# Patient Record
Sex: MALE | Race: WHITE | NOT HISPANIC OR LATINO
[De-identification: names, ages, dates, MRNs, and addresses within clinical notes are randomized per-mention and may not be internally consistent; named-entity substitution may affect disease eponyms.]

---

## 2023-07-13 PROBLEM — Z00.00 ENCOUNTER FOR PREVENTIVE HEALTH EXAMINATION: Status: ACTIVE | Noted: 2023-07-13

## 2023-07-14 ENCOUNTER — APPOINTMENT (OUTPATIENT)
Dept: ORTHOPEDIC SURGERY | Facility: CLINIC | Age: 78
End: 2023-07-14
Payer: MEDICARE

## 2023-07-14 VITALS — HEIGHT: 67 IN | WEIGHT: 175 LBS | BODY MASS INDEX: 27.47 KG/M2

## 2023-07-14 DIAGNOSIS — Z78.9 OTHER SPECIFIED HEALTH STATUS: ICD-10-CM

## 2023-07-14 PROCEDURE — 73564 X-RAY EXAM KNEE 4 OR MORE: CPT | Mod: 50

## 2023-07-14 PROCEDURE — 99203 OFFICE O/P NEW LOW 30 MIN: CPT

## 2023-07-14 RX ORDER — IBUPROFEN 800 MG
800 TABLET ORAL
Refills: 0 | Status: ACTIVE | COMMUNITY

## 2023-07-14 RX ORDER — OXYCODONE HCL/ACETAMINOPHEN 5 MG-500MG
5-500 CAPSULE ORAL
Refills: 0 | Status: ACTIVE | COMMUNITY

## 2023-07-14 NOTE — PHYSICAL EXAM
[Bilateral] : knee bilaterally [] : light touch is intact throughout [TWNoteComboBox7] : flexion 95 degrees [de-identified] : extension 0 degrees

## 2023-07-14 NOTE — HISTORY OF PRESENT ILLNESS
[Gradual] : gradual [10] : 10 [1] : 2 [Localized] : localized [Sharp] : sharp [Shooting] : shooting [Frequent] : frequent [Household chores] : household chores [Nothing helps with pain getting better] : Nothing helps with pain getting better [Walking] : walking [Stairs] : stairs [Retired] : Work status: retired [de-identified] : 7/14/23: 76yo M with longstanding right knee pain for many years that has worsened over the past few months. Admits to increasing pain and difficulty with prolonged walking/standing, startup, and stairs. He has tried multiple modalities of conservative tx including CSIs and visco series that are no longer providing sufficient relief.  [] : no [FreeTextEntry5] : 77 year old male presents for pain in his right knee. Patients states that in the past he received gel injection and PT which helped in the moment but now the pain is worse. Patient states that years ago he was recommended a knee replacement. Patient is also experiencing pain in left knee but the pain is worse on the right side.

## 2023-07-14 NOTE — ASSESSMENT
[FreeTextEntry1] : 77M with adv bilateral knee OA, large lyic proximal tibia lesion\par \par f/u MRI w/wout contrast\par f/u CT R knee\par recommend f.u orthopedic oncology re further workup, discuss potentially benign lesion but could be metastatic lesion and requires attention prior to any surgical intervention\par \par The patient was advised of the diagnosis. The natural history of the pathology was explained in full to the patient in layman's terms. All questions were answered. The risks and benefits of surgical and non-surgical treatment alternatives were explained in full to the patient. \par

## 2023-07-16 ENCOUNTER — FORM ENCOUNTER (OUTPATIENT)
Age: 78
End: 2023-07-16

## 2023-07-17 ENCOUNTER — APPOINTMENT (OUTPATIENT)
Dept: MRI IMAGING | Facility: CLINIC | Age: 78
End: 2023-07-17
Payer: MEDICARE

## 2023-07-17 ENCOUNTER — TRANSCRIPTION ENCOUNTER (OUTPATIENT)
Age: 78
End: 2023-07-17

## 2023-07-17 ENCOUNTER — APPOINTMENT (OUTPATIENT)
Dept: CT IMAGING | Facility: CLINIC | Age: 78
End: 2023-07-17
Payer: MEDICARE

## 2023-07-17 PROCEDURE — ZZZZZ: CPT

## 2023-07-17 PROCEDURE — 73700 CT LOWER EXTREMITY W/O DYE: CPT | Mod: NC

## 2023-07-17 PROCEDURE — 76376 3D RENDER W/INTRP POSTPROCES: CPT

## 2023-07-17 PROCEDURE — 73723 MRI JOINT LWR EXTR W/O&W/DYE: CPT | Mod: RT

## 2023-07-24 ENCOUNTER — APPOINTMENT (OUTPATIENT)
Dept: ORTHOPEDIC SURGERY | Facility: CLINIC | Age: 78
End: 2023-07-24
Payer: MEDICARE

## 2023-07-24 VITALS — WEIGHT: 175 LBS | BODY MASS INDEX: 27.47 KG/M2 | HEIGHT: 67 IN

## 2023-07-24 DIAGNOSIS — C80.1 MALIGNANT (PRIMARY) NEOPLASM, UNSPECIFIED: ICD-10-CM

## 2023-07-24 PROCEDURE — 99214 OFFICE O/P EST MOD 30 MIN: CPT

## 2023-07-24 NOTE — HISTORY OF PRESENT ILLNESS
[Right Leg] : right leg [10] : 10 [4] : 4 [Localized] : localized [Sharp] : sharp [Constant] : constant [Rest] : rest [Walking] : walking [Exercising] : exercising [Gel One] : Gel One [de-identified] : 7/14/23: 76yo M with longstanding right knee pain for many years that has worsened over the past few months. Admits to increasing pain and difficulty with prolonged walking/standing, startup, and stairs. He has tried multiple modalities of conservative tx including CSIs and visco series that are no longer providing sufficient relief. \par 7/24/23 mri ct review today, symptoms unchanged [] : no [FreeTextEntry1] : rt knee  [FreeTextEntry5] : CT and MRI results

## 2023-07-24 NOTE — PHYSICAL EXAM
[Bilateral] : knee bilaterally [] : light touch is intact throughout [TWNoteComboBox7] : flexion 95 degrees [de-identified] : extension 0 degrees

## 2023-07-24 NOTE — ASSESSMENT
[FreeTextEntry1] : 77M with adv bilateral knee OA, large lyic proximal tibia lesion\par \par MRI CT reviewed\par recommend f.u orthopedic oncology re further workup, discussed potentially benign lesion but could be metastatic lesion and requires attention prior to any surgical intervention\par \par The patient was advised of the diagnosis. The natural history of the pathology was explained in full to the patient in layman's terms. All questions were answered. The risks and benefits of surgical and non-surgical treatment alternatives were explained in full to the patient. \par

## 2023-07-31 ENCOUNTER — APPOINTMENT (OUTPATIENT)
Dept: ORTHOPEDIC SURGERY | Facility: CLINIC | Age: 78
End: 2023-07-31
Payer: MEDICARE

## 2023-07-31 VITALS
WEIGHT: 175 LBS | DIASTOLIC BLOOD PRESSURE: 71 MMHG | TEMPERATURE: 98 F | HEART RATE: 65 BPM | BODY MASS INDEX: 27.47 KG/M2 | HEIGHT: 67 IN | SYSTOLIC BLOOD PRESSURE: 177 MMHG

## 2023-07-31 DIAGNOSIS — M17.11 UNILATERAL PRIMARY OSTEOARTHRITIS, RIGHT KNEE: ICD-10-CM

## 2023-07-31 DIAGNOSIS — M89.9 DISORDER OF BONE, UNSPECIFIED: ICD-10-CM

## 2023-07-31 PROCEDURE — 99214 OFFICE O/P EST MOD 30 MIN: CPT

## 2023-08-08 PROBLEM — M17.11 PRIMARY OSTEOARTHRITIS OF RIGHT KNEE: Status: ACTIVE | Noted: 2023-07-14

## 2023-08-08 PROBLEM — M89.9 LYTIC LESION OF BONE ON X-RAY: Status: ACTIVE | Noted: 2023-07-14

## 2023-08-08 NOTE — PHYSICAL EXAM
[FreeTextEntry1] : On exam the patient stands in good balance.  He has tenderness at the joint lines as well as at the proximal tibia on the right worse than the left knee pain.  He has an overall varus alignment.  He is correctable.  He is stable to varus valgus and anterior testing.  Range of motion is 0 to 110 degrees.  His calves are soft and he is neurovascular intact. [General Appearance - Well-Appearing] : Well appearing [General Appearance - Well Nourished] : well nourished [Oriented To Time, Place, And Person] : Oriented to person, place, and time [Sclera] : the sclera and conjunctiva were normal [Neck Cervical Mass (___cm)] : no neck mass was observed [Heart Rate And Rhythm] : heart rate was normal and rhythm regular [] : No respiratory distress [Abdomen Soft] : Soft [Antalgic Gait Right] : antalgic on the right [Antalgic Gait Left] : antalgic on the left [Tenderness] : tenderness [Swelling] : no swelling [Skin Changes - Describe changes:] : No skin changes noted [Full ROM Unless otherwise noted:] : Full range of motion unless otherwise noted: [LE  Motor Strength Normal unless otherwise noted:] : 5/5 strength in bilateral lower extemities unless otherwise noted. [Normal] : Sensation intact to light touch.

## 2023-08-08 NOTE — DISCUSSION/SUMMARY
[All Questions Answered] : Patient (and family) had all questions answered to an agreeable level of satisfaction [Interested in Proceeding] : Patient (and family) expressed understanding and interest in proceeding with the plan as outlined [de-identified] : Patient significant degenerative arthrosis in bilateral knees right worse than left there is also significant bone loss in the right proximal tibia.  My recommendation is to fill this with bone and then to plan for a longstem tibial component.  We can do this with a robot-assisted however he is going to need to have pins placed in the tibial shaft rather than in the incision.  I would also likely use a significant bone graft and a sleeve in the area.  Some of the autograft and some will be allograft.  He understands this is to help him with his arthrosis however he is at risk for other issues such as early loosening. We discussed the risks benefits and alternatives of arthroplasty including the risks of bleeding requiring transfusion, infection, late infection from bacteremia, instability, malalignment, periprosthetic fracture, early need for revision, loosening, possible dislocation, numbness in the area of surgery, continued pain, altered gait, limb length discrepancy, deep vein thrombosis and medical and anesthetic complications.  He is going to decide whether to have surgery with me or with his original orthopedist.  If he has surgery with me him going get a new CT scan for Florin planning.  If imaging was ordered, the patient was told to make an appointment to review findings right after all imaging is completed.  We discussed risks, benefits and alternatives. Rationale of care was reviewed and all questions were answered. Patient (and family) had all questions answered to her degree of the level of satisfaction. Patient (and family) expressed understanding and interest in proceeding with the plan as outlined.     This note was done with a voice recognition transcription software and any typos are related to this rather than medical error. Surgical risks reviewed. Patient (and family) had all questions answered to an agreeable level of satisfaction. Patient (and family) expressed understanding and interest in proceeding with the plan as outlined.

## 2023-08-08 NOTE — DATA REVIEWED
[Imaging Present] : Present [de-identified] : X-rays reviewed from July 2023 show significant varus arthritis in bilateral knees.  There is significant bone lesion in the proximal tibia of the right compared to the left.  There is some faint groundglass appearance.  This does come to the cortex but does not go through.  Is coming from the medial joint line.  In addition there is significant degenerative arthrosis of both knees with varus alignment medial joint space obliteration and osteophyte formation.  CT scan July 17, 2023 shows large lytic area in the right proximal tibia and severe degenerative disease.  There is lucency consistent with degenerative intraosseous ganglion.  MRI scan from July 17, 2023 shows large lytic area in the proximal tibial metaphysis consistent with degenerative lesion.  There is also significant severe diffuse right knee arthrosis with chronic ACL tear ligament sprains medial meniscus tear and loose bodies.  There is also extensor mechanism tendinopathy.

## 2023-08-08 NOTE — REVIEW OF SYSTEMS
[Feeling Tired] : feeling tired [Joint Pain] : joint pain [Joint Stiffness] : joint stiffness [Joint Swelling] : joint swelling [Nl] : Hematologic/Lymphatic

## 2023-08-08 NOTE — HISTORY OF PRESENT ILLNESS
[FreeTextEntry1] : This is a 77 years old gentleman who comes in complaining of bilateral right worse than left knee pain that has been going on for many years.  He has tried corticosteroid injections with no relief.  He has tried physical therapy.  He has viscosupplementation in the past with no relief.  He now has continued pain.  He was recommended for knee replacement was found to have a bone lesion so sent to me for further evaluation. [Worsening] : worsening [7] : currently ~his/her~ pain is 7 out of 10

## 2023-08-08 NOTE — REASON FOR VISIT
[FreeTextEntry1] : Bilateral right worse than left knee pain, sent with a lesion seen in the right proximal tibia, sen by Dr. Chicas

## 2023-08-08 NOTE — CONSULT LETTER
[Dear  ___] : Dear  [unfilled], [FreeTextEntry2] : Aviva Marin MD [address] [place] [address],  [address] [place] [address] [FreeTextEntry1] :  After evaluating your patient and reviewing the studies presented we have come to a mutually agreeable plan.   Please see my note below.  Should you have further questions, please feel free to call and discuss the care of your patient.  Thank you for the confidence of your referral.  Sincerely,   Josemanuel Reynolds MD  Chief, Musculoskeletal Oncology   [phone]

## 2023-08-16 ENCOUNTER — OUTPATIENT (OUTPATIENT)
Dept: OUTPATIENT SERVICES | Facility: HOSPITAL | Age: 78
LOS: 1 days | End: 2023-08-16
Payer: MEDICARE

## 2023-08-16 VITALS
HEIGHT: 64.5 IN | OXYGEN SATURATION: 99 % | HEART RATE: 58 BPM | SYSTOLIC BLOOD PRESSURE: 156 MMHG | DIASTOLIC BLOOD PRESSURE: 63 MMHG | RESPIRATION RATE: 16 BRPM | TEMPERATURE: 99 F | WEIGHT: 184.97 LBS

## 2023-08-16 DIAGNOSIS — M17.11 UNILATERAL PRIMARY OSTEOARTHRITIS, RIGHT KNEE: ICD-10-CM

## 2023-08-16 DIAGNOSIS — Z95.5 PRESENCE OF CORONARY ANGIOPLASTY IMPLANT AND GRAFT: ICD-10-CM

## 2023-08-16 DIAGNOSIS — Z90.49 ACQUIRED ABSENCE OF OTHER SPECIFIED PARTS OF DIGESTIVE TRACT: Chronic | ICD-10-CM

## 2023-08-16 DIAGNOSIS — M89.9 DISORDER OF BONE, UNSPECIFIED: ICD-10-CM

## 2023-08-16 DIAGNOSIS — Z98.890 OTHER SPECIFIED POSTPROCEDURAL STATES: Chronic | ICD-10-CM

## 2023-08-16 DIAGNOSIS — C02.9 MALIGNANT NEOPLASM OF TONGUE, UNSPECIFIED: Chronic | ICD-10-CM

## 2023-08-16 DIAGNOSIS — I10 ESSENTIAL (PRIMARY) HYPERTENSION: ICD-10-CM

## 2023-08-16 LAB
A1C WITH ESTIMATED AVERAGE GLUCOSE RESULT: 4.4 % — SIGNIFICANT CHANGE UP (ref 4–5.6)
ANION GAP SERPL CALC-SCNC: 12 MMOL/L — SIGNIFICANT CHANGE UP (ref 7–14)
APPEARANCE UR: CLEAR — SIGNIFICANT CHANGE UP
BILIRUB UR-MCNC: NEGATIVE — SIGNIFICANT CHANGE UP
BLD GP AB SCN SERPL QL: NEGATIVE — SIGNIFICANT CHANGE UP
BUN SERPL-MCNC: 17 MG/DL — SIGNIFICANT CHANGE UP (ref 7–23)
CALCIUM SERPL-MCNC: 9.5 MG/DL — SIGNIFICANT CHANGE UP (ref 8.4–10.5)
CHLORIDE SERPL-SCNC: 104 MMOL/L — SIGNIFICANT CHANGE UP (ref 98–107)
CO2 SERPL-SCNC: 25 MMOL/L — SIGNIFICANT CHANGE UP (ref 22–31)
COLOR SPEC: YELLOW — SIGNIFICANT CHANGE UP
CREAT SERPL-MCNC: 0.91 MG/DL — SIGNIFICANT CHANGE UP (ref 0.5–1.3)
DIFF PNL FLD: NEGATIVE — SIGNIFICANT CHANGE UP
EGFR: 88 ML/MIN/1.73M2 — SIGNIFICANT CHANGE UP
ESTIMATED AVERAGE GLUCOSE: 80 — SIGNIFICANT CHANGE UP
GLUCOSE SERPL-MCNC: 103 MG/DL — HIGH (ref 70–99)
GLUCOSE UR QL: NEGATIVE MG/DL — SIGNIFICANT CHANGE UP
HCT VFR BLD CALC: 40.1 % — SIGNIFICANT CHANGE UP (ref 39–50)
HGB BLD-MCNC: 13.7 G/DL — SIGNIFICANT CHANGE UP (ref 13–17)
KETONES UR-MCNC: NEGATIVE MG/DL — SIGNIFICANT CHANGE UP
LEUKOCYTE ESTERASE UR-ACNC: NEGATIVE — SIGNIFICANT CHANGE UP
MCHC RBC-ENTMCNC: 31.3 PG — SIGNIFICANT CHANGE UP (ref 27–34)
MCHC RBC-ENTMCNC: 34.2 GM/DL — SIGNIFICANT CHANGE UP (ref 32–36)
MCV RBC AUTO: 91.6 FL — SIGNIFICANT CHANGE UP (ref 80–100)
NITRITE UR-MCNC: NEGATIVE — SIGNIFICANT CHANGE UP
NRBC # BLD: 0 /100 WBCS — SIGNIFICANT CHANGE UP (ref 0–0)
NRBC # FLD: 0 K/UL — SIGNIFICANT CHANGE UP (ref 0–0)
PH UR: 5.5 — SIGNIFICANT CHANGE UP (ref 5–8)
PLATELET # BLD AUTO: 167 K/UL — SIGNIFICANT CHANGE UP (ref 150–400)
POTASSIUM SERPL-MCNC: 3.7 MMOL/L — SIGNIFICANT CHANGE UP (ref 3.5–5.3)
POTASSIUM SERPL-SCNC: 3.7 MMOL/L — SIGNIFICANT CHANGE UP (ref 3.5–5.3)
PROT UR-MCNC: NEGATIVE MG/DL — SIGNIFICANT CHANGE UP
RBC # BLD: 4.38 M/UL — SIGNIFICANT CHANGE UP (ref 4.2–5.8)
RBC # FLD: 12.7 % — SIGNIFICANT CHANGE UP (ref 10.3–14.5)
RH IG SCN BLD-IMP: NEGATIVE — SIGNIFICANT CHANGE UP
SODIUM SERPL-SCNC: 141 MMOL/L — SIGNIFICANT CHANGE UP (ref 135–145)
SP GR SPEC: 1.02 — SIGNIFICANT CHANGE UP (ref 1–1.03)
UROBILINOGEN FLD QL: 0.2 MG/DL — SIGNIFICANT CHANGE UP (ref 0.2–1)
WBC # BLD: 7.65 K/UL — SIGNIFICANT CHANGE UP (ref 3.8–10.5)
WBC # FLD AUTO: 7.65 K/UL — SIGNIFICANT CHANGE UP (ref 3.8–10.5)

## 2023-08-16 PROCEDURE — 93010 ELECTROCARDIOGRAM REPORT: CPT

## 2023-08-16 RX ORDER — CHLORHEXIDINE GLUCONATE 213 G/1000ML
1 SOLUTION TOPICAL DAILY
Refills: 0 | Status: DISCONTINUED | OUTPATIENT
Start: 2023-08-30 | End: 2023-09-01

## 2023-08-16 RX ORDER — SODIUM CHLORIDE 9 MG/ML
3 INJECTION INTRAMUSCULAR; INTRAVENOUS; SUBCUTANEOUS EVERY 8 HOURS
Refills: 0 | Status: DISCONTINUED | OUTPATIENT
Start: 2023-08-30 | End: 2023-09-01

## 2023-08-16 NOTE — H&P PST ADULT - BP NONINVASIVE DIASTOLIC (MM HG)
Assessment/Plan:    Healthcare maintenance  Patient is here today for a full physical   He did have extensive labs completed prior to the visit today we did discuss the results  The only outlying lab test that was noted was an increase in his cholesterol reading to almost 300  Otherwise labs were essentially normal   Patient states in general doing well his back is doing better with less pain  He continues to do exercise on a daily basis, stretching  States that occasionally he does have some weakness in the right leg which does inhibit ambulation for short period of time but this is transient  Patient has no significant pain  He denies any chest pain or pressure and no increasing shortness of breath with exertion  States his appetite is adequate he is having no bowel or bladder difficulties  He did undergo a routine colonoscopy and it showed 1 small polyp which was benign  Patient states he has having no difficulties with his appetite no headaches, no lightheadedness, no dizziness  Does relate that he does have decreased auditory acuity bilaterally which is getting worse  Patient did undergo full physical exam today in the office  Of than him being overweight no significant medical problems on physical exam were found  Patient will have a referral be seen by audiology for hearing testing  Was placed on a statin drug and we will check liver function tests and cholesterol in approximately 4 months  We did discuss possible side effects of the medication a was told to call if any problems with the medication    Chronic bilateral low back pain without sciatica  Patient states that he has functional   No significant chronic pain or difficulties related to his low back pain discomfort  He is following the exercise as prescribed by physical therapy    He was told if any change as far as symptomatology is concerned, any increasing weakness to lower extremities, bowel or bladder difficulties to please call     Familial hypercholesterolemia  Patient relates that there is a strong family history of hyperlipidemia  His cholesterol is elevated and we did discuss with the patient today the importance of watching his intake of fatty foods, working at weight loss  Patient's cholesterol is high enough that we feel it is appropriate to start medication  He was given a prescription for Crestor 5 mg to take daily  He was told that this medication does work in the liver and can cause some slight inflammation to liver tissue  Was told if any darkening of the urine, at jaundice to please call  Patient was also told about possible myalgias, muscle pain that can be a side effect from the medication  Patient was told if he develops any of these symptoms to please call immediately for evaluation  Again we will be checking patient's liver enzymes and cholesterol with his next visit    Fatigue  Patient relates that he is having less difficulty with fatigue  He further relates that it is helped that 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 and he is getting more restful sleep  Again we did check routine labs showing no abnormalities of any significant that could be causing fatigability  Patient was told that he may benefit from a routine exercise program aerobic activity on a daily basis  Patient understands and agrees  Was told if any significant changes prior to his next visit to please call  Overweight  Patient was told again with his BMI is considered overweight  The importance of watching his caloric intake in order to help lose weight  Does relate that he is on a very standard diet without any significant changes  We did discuss with the patient trying to reduce his portion size in order to help lose weight, starting some type of routine exercise program in order to help with weight loss    Patient understands and hopefully we will see some weight loss with his next visit  Decreased hearing of both ears  As mentioned patient is having difficulties with hearing loss  He states this is become progressively worse over the years  We did discuss with the patient having hearing evaluation to formally determine the extent of his hearing loss  Patient understands and agrees  A referral was made to an ears nose and throat physician  No abnormalities were found on physical exam    Left groin mass  Patient has been watching to make sure there is no changes in the left groin area  On evaluation patient does have some fatty tissue  No masses no evidence of herniation  Again patient is urged to call if any significant changes but on physical findings today it seems to be benign       Diagnoses and all orders for this visit:    Familial hypercholesterolemia  -     rosuvastatin (CRESTOR) 5 mg tablet; Take 1 tablet (5 mg total) by mouth daily  -     Lipid panel; Future  -     Hepatic function panel; Future    Healthcare maintenance    Decreased hearing of both ears  -     Ambulatory Referral to Otolaryngology; Future    Chronic bilateral low back pain without sciatica    Fatigue, unspecified type    Overweight    Left groin mass          Subjective:      Patient ID: Jhonathan Marcelino is a 64 y o  male  Patient is a 61-year-old male with a history of hyperlipidemia, chronic low back pain with sciatica on the right, overweight  Patient is here today for a routine physical he did have extensive lab testing performed prior to the visit today and we did discuss the results  Patient states he is doing relatively well in July he did start a new job which she enjoys  He denies any chest pain or pressure and no shortness of breath  Does describe some decreased auditory acuity bilaterally  He denies any headaches, lightheadedness dizziness  Occasionally some weakness in the right leg which may be residual finding from previous sciatic pain, probably nerve entrapment        The following portions of the patient's history were reviewed and updated as appropriate: He  has no past medical history on file  He   Patient Active Problem List    Diagnosis Date Noted    Familial hypercholesterolemia 08/14/2020    Decreased hearing of both ears 08/14/2020    Chronic bilateral low back pain without sciatica 12/05/2019    Fatigue 12/05/2018    Overweight 12/05/2018    Postnasal drip 12/05/2018    Mood disturbance 12/05/2018    KEVIN (obstructive sleep apnea)     Snoring     Daytime sleepiness     Left groin mass 06/26/2018    Healthcare maintenance 06/26/2018     He  has no past surgical history on file  His family history is not on file  He  reports that he has never smoked  He has never used smokeless tobacco  He reports current alcohol use  He reports that he does not use drugs  Current Outpatient Medications   Medication Sig Dispense Refill    rosuvastatin (CRESTOR) 5 mg tablet Take 1 tablet (5 mg total) by mouth daily 30 tablet 5     No current facility-administered medications for this visit  No current outpatient medications on file prior to visit  No current facility-administered medications on file prior to visit  He has No Known Allergies       Review of Systems   Constitutional: Positive for activity change (States with his new job he is more physically active)  Negative for appetite change, chills, diaphoresis, fatigue, fever and unexpected weight change  HENT: Positive for hearing loss  Negative for congestion, dental problem, drooling, ear discharge, ear pain, facial swelling, mouth sores, nosebleeds, postnasal drip, rhinorrhea, sinus pressure, sinus pain, sneezing, sore throat, tinnitus, trouble swallowing and voice change  Eyes: Negative  Respiratory: Negative  Cardiovascular: Negative  Gastrointestinal: Negative  Endocrine: Negative  Genitourinary: Negative      Musculoskeletal: Positive for back pain ( patient admits to some significant improvement with his chronic low back pain and discomfort  )  Negative for arthralgias, gait problem, joint swelling, myalgias, neck pain and neck stiffness  Skin: Negative  Allergic/Immunologic: Negative  Neurological: Positive for weakness ( occasionally episodic weakness in the right lower extremity)  Negative for dizziness, tremors, seizures, syncope, facial asymmetry, speech difficulty, light-headedness, numbness and headaches  Hematological: Negative  Psychiatric/Behavioral: Negative  Objective:      /68   Pulse 92   Temp 98 6 °F (37 °C)   Ht 5' 10" (1 778 m)   Wt 92 1 kg (203 lb)   SpO2 98%   BMI 29 13 kg/m²          Physical Exam  Vitals signs and nursing note reviewed  Constitutional:       General: He is not in acute distress  Appearance: Normal appearance  He is not ill-appearing, toxic-appearing or diaphoretic  Comments: Pleasant, overweight 53-year-old male who is awake alert no acute distress and oriented x3   HENT:      Head: Normocephalic and atraumatic  Right Ear: Tympanic membrane, ear canal and external ear normal  There is no impacted cerumen  Left Ear: Tympanic membrane, ear canal and external ear normal  There is no impacted cerumen  Ears:      Comments: Noticeable decreased auditory acuity on gross examination     Nose: Nose normal  No congestion or rhinorrhea  Mouth/Throat:      Mouth: Mucous membranes are moist       Pharynx: Oropharynx is clear  No oropharyngeal exudate or posterior oropharyngeal erythema  Eyes:      General: No scleral icterus  Right eye: No discharge  Left eye: No discharge  Extraocular Movements: Extraocular movements intact  Conjunctiva/sclera: Conjunctivae normal       Pupils: Pupils are equal, round, and reactive to light  Neck:      Musculoskeletal: Normal range of motion and neck supple  No neck rigidity or muscular tenderness  Vascular: No carotid bruit  Cardiovascular:      Rate and Rhythm: Normal rate and regular rhythm  Pulses: Normal pulses  Heart sounds: Normal heart sounds  No murmur  No friction rub  No gallop  Pulmonary:      Effort: Pulmonary effort is normal  No respiratory distress  Breath sounds: Normal breath sounds  No stridor  No wheezing, rhonchi or rales  Chest:      Chest wall: No tenderness  Abdominal:      General: Bowel sounds are normal  There is no distension  Palpations: Abdomen is soft  There is no mass  Tenderness: There is no abdominal tenderness  There is no right CVA tenderness, left CVA tenderness, guarding or rebound  Hernia: No hernia is present  Comments: Overweight   Genitourinary:     Penis: Normal        Scrotum/Testes: Normal       Prostate: Normal       Rectum: Normal    Musculoskeletal: Normal range of motion  General: Deformity (Slight flattening to the lumbar spine, full range of motion without pain) present  No swelling, tenderness or signs of injury  Right lower leg: No edema  Left lower leg: No edema  Lymphadenopathy:      Cervical: No cervical adenopathy  Skin:     General: Skin is warm and dry  Capillary Refill: Capillary refill takes less than 2 seconds  Coloration: Skin is not jaundiced or pale  Findings: No bruising, erythema, lesion or rash  Neurological:      Mental Status: He is alert and oriented to person, place, and time  Mental status is at baseline  Cranial Nerves: No cranial nerve deficit  Sensory: No sensory deficit  Motor: No weakness  Coordination: Coordination normal       Gait: Gait normal       Deep Tendon Reflexes: Reflexes abnormal ( patient does have a loss of his deep tendon reflex patella and Achilles on the right)  Psychiatric:         Mood and Affect: Mood normal          Behavior: Behavior normal          Thought Content:  Thought content normal          Judgment: Judgment normal          BMI Counseling: Body mass index is 29 13 kg/m²  The BMI is above normal  Nutrition recommendations include reducing portion sizes, decreasing overall calorie intake, 3-5 servings of fruits/vegetables daily, moderation in carbohydrate intake, increasing intake of lean protein, reducing intake of saturated fat and trans fat and reducing intake of cholesterol  Exercise recommendations include moderate aerobic physical activity for 150 minutes/week  63

## 2023-08-16 NOTE — H&P PST ADULT - ATTENDING COMMENTS
I have reviewed and agree with note as written above  for curettage / bone graft left tibia lesion with total knee arthroplasty, ruslan assist  Risks, benefits and alternatives discussed with patient.  Josemanuel Reynolds MD  Musculoskeletal Oncology  [phone] I have reviewed and agree with note as written above  for curettage / bone graft right tibia lesion with total knee arthroplasty, ruslan assist  Risks, benefits and alternatives discussed with patient.  Josemanuel Reynolds MD  Musculoskeletal Oncology  [phone]

## 2023-08-16 NOTE — H&P PST ADULT - HISTORY OF PRESENT ILLNESS
76 y/o male presents to [facility] preop for right knee arthroplasty, ruslan assist, resection assist, resection of bone lesion with allografting filling. Pt reports bilateral knee pain, worse on the right then the left knee for many years. He has tried conservative measures (steroid injections and physical therapy) with no relief. Xray of the bilateral knees revealed significant arthritis.

## 2023-08-16 NOTE — H&P PST ADULT - NSANTHOSAYNRD_GEN_A_CORE
No. DEMIAN screening performed.  STOP BANG Legend: 0-2 = LOW Risk; 3-4 = INTERMEDIATE Risk; 5-8 = HIGH Risk

## 2023-08-16 NOTE — H&P PST ADULT - PROBLEM SELECTOR PLAN 1
preop for right knee arthroplasty, ruslan assist, resection assist, resection of bone lesion with allografting filling on 8/30/23  preop instructions given, pt verbalized understanding  GI prophylaxis and chlorhexidine wash provided  cbc, bmp, type, hga1c, ua, urine culture MRSA culture pending

## 2023-08-16 NOTE — H&P PST ADULT - MUSCULOSKELETAL
right knee. +crepitus with flexion and extension of right knee/no calf tenderness/decreased ROM due to pain/strength 5/5 bilateral upper extremities/back exam/decreased strength details…

## 2023-08-16 NOTE — H&P PST ADULT - NSICDXPASTMEDICALHX_GEN_ALL_CORE_FT
no weight-bearing restrictions PAST MEDICAL HISTORY:  Arteriosclerotic heart disease (ASHD)     CAD (coronary artery disease)     Disorder of bone     H/O non-ST elevation myocardial infarction (NSTEMI)     H/O tongue cancer     HLD (hyperlipidemia)     HTN (hypertension)     Stented coronary artery     Unilateral primary osteoarthritis, right knee

## 2023-08-16 NOTE — H&P PST ADULT - NEGATIVE NEUROLOGICAL SYMPTOMS
no transient paralysis/no weakness/no paresthesias/no generalized seizures/no focal seizures/no syncope/no tremors/no loss of sensation/no headache/no loss of consciousness/no hemiparesis/no confusion/no facial palsy

## 2023-08-16 NOTE — H&P PST ADULT - ASSESSMENT
76 y/o male presents to [facility] preop for right knee arthroplasty, ruslan assist, resection assist, resection of bone lesion with allografting filling. Pt reports bilateral knee pain, worse on the right then the left knee for many years. He has tried conservative measures (steroid injections and physical therapy) with no relief. Xray of the bilateral knees revealed significant arthritis.

## 2023-08-17 LAB
CULTURE RESULTS: SIGNIFICANT CHANGE UP
MRSA PCR RESULT.: SIGNIFICANT CHANGE UP
S AUREUS DNA NOSE QL NAA+PROBE: DETECTED
SPECIMEN SOURCE: SIGNIFICANT CHANGE UP

## 2023-08-21 DIAGNOSIS — Z22.321 CARRIER OR SUSPECTED CARRIER OF METHICILLIN SUSCEPTIBLE STAPHYLOCOCCUS AUREUS: ICD-10-CM

## 2023-08-21 RX ORDER — MUPIROCIN 20 MG/G
2 OINTMENT TOPICAL TWICE DAILY
Qty: 1 | Refills: 0 | Status: ACTIVE | COMMUNITY
Start: 2023-08-21 | End: 1900-01-01

## 2023-08-22 PROBLEM — E78.5 HYPERLIPIDEMIA, UNSPECIFIED: Chronic | Status: ACTIVE | Noted: 2023-08-16

## 2023-08-22 PROBLEM — Z85.810 PERSONAL HISTORY OF MALIGNANT NEOPLASM OF TONGUE: Chronic | Status: ACTIVE | Noted: 2023-08-16

## 2023-08-22 PROBLEM — I25.2 OLD MYOCARDIAL INFARCTION: Chronic | Status: ACTIVE | Noted: 2023-08-16

## 2023-08-22 PROBLEM — Z95.5 PRESENCE OF CORONARY ANGIOPLASTY IMPLANT AND GRAFT: Chronic | Status: ACTIVE | Noted: 2023-08-16

## 2023-08-22 PROBLEM — M17.11 UNILATERAL PRIMARY OSTEOARTHRITIS, RIGHT KNEE: Chronic | Status: ACTIVE | Noted: 2023-08-16

## 2023-08-22 PROBLEM — M89.9 DISORDER OF BONE, UNSPECIFIED: Chronic | Status: ACTIVE | Noted: 2023-08-16

## 2023-08-22 PROBLEM — I10 ESSENTIAL (PRIMARY) HYPERTENSION: Chronic | Status: ACTIVE | Noted: 2023-08-16

## 2023-08-22 PROBLEM — I25.10 ATHEROSCLEROTIC HEART DISEASE OF NATIVE CORONARY ARTERY WITHOUT ANGINA PECTORIS: Chronic | Status: ACTIVE | Noted: 2023-08-16

## 2023-08-28 ENCOUNTER — APPOINTMENT (OUTPATIENT)
Dept: CT IMAGING | Facility: CLINIC | Age: 78
End: 2023-08-28
Payer: MEDICARE

## 2023-08-28 PROCEDURE — 73700 CT LOWER EXTREMITY W/O DYE: CPT | Mod: RT

## 2023-08-29 ENCOUNTER — TRANSCRIPTION ENCOUNTER (OUTPATIENT)
Age: 78
End: 2023-08-29

## 2023-08-29 NOTE — ASU PATIENT PROFILE, ADULT - NSICDXPASTMEDICALHX_GEN_ALL_CORE_FT
PAST MEDICAL HISTORY:  Arteriosclerotic heart disease (ASHD)     CAD (coronary artery disease)     Disorder of bone     H/O non-ST elevation myocardial infarction (NSTEMI)     H/O tongue cancer     HLD (hyperlipidemia)     HTN (hypertension)     Stented coronary artery     Unilateral primary osteoarthritis, right knee

## 2023-08-29 NOTE — ASU PATIENT PROFILE, ADULT - TEACHING/LEARNING RELIGIOUS CONSIDERATIONS
"Please see \"Imaging\" tab under Chart Review for full details.    Gladis Bella MD  Maternal Fetal Medicine    " none

## 2023-08-30 ENCOUNTER — TRANSCRIPTION ENCOUNTER (OUTPATIENT)
Age: 78
End: 2023-08-30

## 2023-08-30 ENCOUNTER — INPATIENT (INPATIENT)
Facility: HOSPITAL | Age: 78
LOS: 1 days | Discharge: NURSING, REHAB OR LONG-TERM CARE | End: 2023-09-01
Attending: ORTHOPAEDIC SURGERY | Admitting: ORTHOPAEDIC SURGERY
Payer: MEDICARE

## 2023-08-30 ENCOUNTER — APPOINTMENT (OUTPATIENT)
Dept: ORTHOPEDIC SURGERY | Facility: HOSPITAL | Age: 78
End: 2023-08-30

## 2023-08-30 VITALS
SYSTOLIC BLOOD PRESSURE: 153 MMHG | HEIGHT: 64.5 IN | WEIGHT: 184.97 LBS | TEMPERATURE: 98 F | DIASTOLIC BLOOD PRESSURE: 57 MMHG | HEART RATE: 62 BPM | OXYGEN SATURATION: 100 % | RESPIRATION RATE: 16 BRPM

## 2023-08-30 DIAGNOSIS — Z98.890 OTHER SPECIFIED POSTPROCEDURAL STATES: Chronic | ICD-10-CM

## 2023-08-30 DIAGNOSIS — Z90.49 ACQUIRED ABSENCE OF OTHER SPECIFIED PARTS OF DIGESTIVE TRACT: Chronic | ICD-10-CM

## 2023-08-30 DIAGNOSIS — C02.9 MALIGNANT NEOPLASM OF TONGUE, UNSPECIFIED: Chronic | ICD-10-CM

## 2023-08-30 DIAGNOSIS — M89.9 DISORDER OF BONE, UNSPECIFIED: ICD-10-CM

## 2023-08-30 LAB
ANION GAP SERPL CALC-SCNC: 11 MMOL/L — SIGNIFICANT CHANGE UP (ref 7–14)
BUN SERPL-MCNC: 17 MG/DL — SIGNIFICANT CHANGE UP (ref 7–23)
CALCIUM SERPL-MCNC: 8.7 MG/DL — SIGNIFICANT CHANGE UP (ref 8.4–10.5)
CHLORIDE SERPL-SCNC: 106 MMOL/L — SIGNIFICANT CHANGE UP (ref 98–107)
CO2 SERPL-SCNC: 21 MMOL/L — LOW (ref 22–31)
CREAT SERPL-MCNC: 0.98 MG/DL — SIGNIFICANT CHANGE UP (ref 0.5–1.3)
EGFR: 80 ML/MIN/1.73M2 — SIGNIFICANT CHANGE UP
GLUCOSE SERPL-MCNC: 125 MG/DL — HIGH (ref 70–99)
HCT VFR BLD CALC: 36 % — LOW (ref 39–50)
HGB BLD-MCNC: 12.4 G/DL — LOW (ref 13–17)
MCHC RBC-ENTMCNC: 31.3 PG — SIGNIFICANT CHANGE UP (ref 27–34)
MCHC RBC-ENTMCNC: 34.4 GM/DL — SIGNIFICANT CHANGE UP (ref 32–36)
MCV RBC AUTO: 90.9 FL — SIGNIFICANT CHANGE UP (ref 80–100)
NRBC # BLD: 0 /100 WBCS — SIGNIFICANT CHANGE UP (ref 0–0)
NRBC # FLD: 0 K/UL — SIGNIFICANT CHANGE UP (ref 0–0)
PLATELET # BLD AUTO: 143 K/UL — LOW (ref 150–400)
POTASSIUM SERPL-MCNC: 4.5 MMOL/L — SIGNIFICANT CHANGE UP (ref 3.5–5.3)
POTASSIUM SERPL-SCNC: 4.5 MMOL/L — SIGNIFICANT CHANGE UP (ref 3.5–5.3)
RBC # BLD: 3.96 M/UL — LOW (ref 4.2–5.8)
RBC # FLD: 12.7 % — SIGNIFICANT CHANGE UP (ref 10.3–14.5)
SODIUM SERPL-SCNC: 138 MMOL/L — SIGNIFICANT CHANGE UP (ref 135–145)
WBC # BLD: 8.63 K/UL — SIGNIFICANT CHANGE UP (ref 3.8–10.5)
WBC # FLD AUTO: 8.63 K/UL — SIGNIFICANT CHANGE UP (ref 3.8–10.5)

## 2023-08-30 PROCEDURE — 73560 X-RAY EXAM OF KNEE 1 OR 2: CPT | Mod: 26,RT

## 2023-08-30 PROCEDURE — 27745 REINFORCE TIBIA: CPT | Mod: RT

## 2023-08-30 PROCEDURE — 27447 TOTAL KNEE ARTHROPLASTY: CPT | Mod: RT

## 2023-08-30 PROCEDURE — 88304 TISSUE EXAM BY PATHOLOGIST: CPT | Mod: 26

## 2023-08-30 PROCEDURE — 27638 REMOVE/GRAFT LEG BONE LESION: CPT | Mod: RT

## 2023-08-30 DEVICE — STEM CMNTLSS TRIATHLON TS TI 14X100MM: Type: IMPLANTABLE DEVICE | Site: RIGHT | Status: FUNCTIONAL

## 2023-08-30 DEVICE — BASEPLATE TIB UNIV TRIATHLON SZ 5: Type: IMPLANTABLE DEVICE | Site: RIGHT | Status: FUNCTIONAL

## 2023-08-30 DEVICE — MAKO BONE PIN 4MM X 140MM: Type: IMPLANTABLE DEVICE | Site: RIGHT | Status: FUNCTIONAL

## 2023-08-30 DEVICE — IMPLANTABLE DEVICE: Type: IMPLANTABLE DEVICE | Site: RIGHT | Status: FUNCTIONAL

## 2023-08-30 DEVICE — MAKO BONE PIN 4MM X 110MM: Type: IMPLANTABLE DEVICE | Site: RIGHT | Status: FUNCTIONAL

## 2023-08-30 DEVICE — COMP FEM TRIATHLON PS SZ 4 RT: Type: IMPLANTABLE DEVICE | Site: RIGHT | Status: FUNCTIONAL

## 2023-08-30 DEVICE — CEMENT SIMPLEX P 40GM: Type: IMPLANTABLE DEVICE | Site: RIGHT | Status: FUNCTIONAL

## 2023-08-30 DEVICE — INSERT TIB BEARING PS X3 SZ 5 13MM: Type: IMPLANTABLE DEVICE | Site: RIGHT | Status: FUNCTIONAL

## 2023-08-30 DEVICE — EXTENDER STEM TRIATHLON 25MM: Type: IMPLANTABLE DEVICE | Site: RIGHT | Status: FUNCTIONAL

## 2023-08-30 DEVICE — IMP PATELLA SYMMETRIC X3 36X10MM: Type: IMPLANTABLE DEVICE | Site: RIGHT | Status: FUNCTIONAL

## 2023-08-30 RX ORDER — ACETAMINOPHEN 500 MG
650 TABLET ORAL EVERY 6 HOURS
Refills: 0 | Status: DISCONTINUED | OUTPATIENT
Start: 2023-08-30 | End: 2023-09-01

## 2023-08-30 RX ORDER — TRAMADOL HYDROCHLORIDE 50 MG/1
50 TABLET ORAL ONCE
Refills: 0 | Status: COMPLETED | OUTPATIENT
Start: 2023-08-30 | End: 2023-08-30

## 2023-08-30 RX ORDER — AMLODIPINE BESYLATE 2.5 MG/1
10 TABLET ORAL DAILY
Refills: 0 | Status: DISCONTINUED | OUTPATIENT
Start: 2023-08-30 | End: 2023-09-01

## 2023-08-30 RX ORDER — SODIUM CHLORIDE 9 MG/ML
1000 INJECTION, SOLUTION INTRAVENOUS
Refills: 0 | Status: DISCONTINUED | OUTPATIENT
Start: 2023-08-30 | End: 2023-09-01

## 2023-08-30 RX ORDER — CARVEDILOL PHOSPHATE 80 MG/1
25 CAPSULE, EXTENDED RELEASE ORAL EVERY 12 HOURS
Refills: 0 | Status: DISCONTINUED | OUTPATIENT
Start: 2023-08-30 | End: 2023-09-01

## 2023-08-30 RX ORDER — MAGNESIUM HYDROXIDE 400 MG/1
30 TABLET, CHEWABLE ORAL DAILY
Refills: 0 | Status: DISCONTINUED | OUTPATIENT
Start: 2023-08-30 | End: 2023-09-01

## 2023-08-30 RX ORDER — CEFAZOLIN SODIUM 1 G
2000 VIAL (EA) INJECTION EVERY 8 HOURS
Refills: 0 | Status: COMPLETED | OUTPATIENT
Start: 2023-08-30 | End: 2023-08-31

## 2023-08-30 RX ORDER — FENTANYL CITRATE 50 UG/ML
50 INJECTION INTRAVENOUS
Refills: 0 | Status: DISCONTINUED | OUTPATIENT
Start: 2023-08-30 | End: 2023-08-30

## 2023-08-30 RX ORDER — CARVEDILOL PHOSPHATE 80 MG/1
1 CAPSULE, EXTENDED RELEASE ORAL
Refills: 0 | DISCHARGE

## 2023-08-30 RX ORDER — PANTOPRAZOLE SODIUM 20 MG/1
40 TABLET, DELAYED RELEASE ORAL ONCE
Refills: 0 | Status: COMPLETED | OUTPATIENT
Start: 2023-08-30 | End: 2023-08-30

## 2023-08-30 RX ORDER — ATORVASTATIN CALCIUM 80 MG/1
20 TABLET, FILM COATED ORAL AT BEDTIME
Refills: 0 | Status: DISCONTINUED | OUTPATIENT
Start: 2023-08-30 | End: 2023-09-01

## 2023-08-30 RX ORDER — OXYCODONE HYDROCHLORIDE 5 MG/1
5 TABLET ORAL
Refills: 0 | Status: DISCONTINUED | OUTPATIENT
Start: 2023-08-30 | End: 2023-09-01

## 2023-08-30 RX ORDER — ONDANSETRON 8 MG/1
4 TABLET, FILM COATED ORAL EVERY 6 HOURS
Refills: 0 | Status: DISCONTINUED | OUTPATIENT
Start: 2023-08-30 | End: 2023-09-01

## 2023-08-30 RX ORDER — POLYETHYLENE GLYCOL 3350 17 G/17G
17 POWDER, FOR SOLUTION ORAL AT BEDTIME
Refills: 0 | Status: DISCONTINUED | OUTPATIENT
Start: 2023-08-30 | End: 2023-09-01

## 2023-08-30 RX ORDER — ASPIRIN/CALCIUM CARB/MAGNESIUM 324 MG
81 TABLET ORAL
Refills: 0 | Status: DISCONTINUED | OUTPATIENT
Start: 2023-08-30 | End: 2023-09-01

## 2023-08-30 RX ORDER — CHOLECALCIFEROL (VITAMIN D3) 125 MCG
1 CAPSULE ORAL
Refills: 0 | DISCHARGE

## 2023-08-30 RX ORDER — SENNA PLUS 8.6 MG/1
2 TABLET ORAL AT BEDTIME
Refills: 0 | Status: DISCONTINUED | OUTPATIENT
Start: 2023-08-30 | End: 2023-09-01

## 2023-08-30 RX ORDER — VALSARTAN 80 MG/1
320 TABLET ORAL DAILY
Refills: 0 | Status: DISCONTINUED | OUTPATIENT
Start: 2023-08-30 | End: 2023-09-01

## 2023-08-30 RX ORDER — OXYCODONE HYDROCHLORIDE 5 MG/1
10 TABLET ORAL
Refills: 0 | Status: DISCONTINUED | OUTPATIENT
Start: 2023-08-30 | End: 2023-09-01

## 2023-08-30 RX ORDER — PANTOPRAZOLE SODIUM 20 MG/1
40 TABLET, DELAYED RELEASE ORAL ONCE
Refills: 0 | Status: DISCONTINUED | OUTPATIENT
Start: 2023-08-30 | End: 2023-08-30

## 2023-08-30 RX ORDER — PANTOPRAZOLE SODIUM 20 MG/1
40 TABLET, DELAYED RELEASE ORAL
Refills: 0 | Status: DISCONTINUED | OUTPATIENT
Start: 2023-08-30 | End: 2023-09-01

## 2023-08-30 RX ORDER — ASCORBIC ACID 60 MG
500 TABLET,CHEWABLE ORAL
Refills: 0 | Status: DISCONTINUED | OUTPATIENT
Start: 2023-08-30 | End: 2023-09-01

## 2023-08-30 RX ORDER — AMLODIPINE BESYLATE 2.5 MG/1
1 TABLET ORAL
Refills: 0 | DISCHARGE

## 2023-08-30 RX ORDER — VALSARTAN 80 MG/1
1 TABLET ORAL
Refills: 0 | DISCHARGE

## 2023-08-30 RX ORDER — SODIUM CHLORIDE 9 MG/ML
1000 INJECTION, SOLUTION INTRAVENOUS
Refills: 0 | Status: DISCONTINUED | OUTPATIENT
Start: 2023-08-30 | End: 2023-08-30

## 2023-08-30 RX ORDER — FENTANYL CITRATE 50 UG/ML
25 INJECTION INTRAVENOUS
Refills: 0 | Status: DISCONTINUED | OUTPATIENT
Start: 2023-08-30 | End: 2023-08-30

## 2023-08-30 RX ORDER — ATORVASTATIN CALCIUM 80 MG/1
1 TABLET, FILM COATED ORAL
Refills: 0 | DISCHARGE

## 2023-08-30 RX ORDER — FOLIC ACID 0.8 MG
1 TABLET ORAL DAILY
Refills: 0 | Status: DISCONTINUED | OUTPATIENT
Start: 2023-08-30 | End: 2023-09-01

## 2023-08-30 RX ORDER — ACETAMINOPHEN 500 MG
975 TABLET ORAL ONCE
Refills: 0 | Status: COMPLETED | OUTPATIENT
Start: 2023-08-30 | End: 2023-08-30

## 2023-08-30 RX ADMIN — Medication 100 MILLIGRAM(S): at 19:52

## 2023-08-30 RX ADMIN — SODIUM CHLORIDE 3 MILLILITER(S): 9 INJECTION INTRAMUSCULAR; INTRAVENOUS; SUBCUTANEOUS at 22:20

## 2023-08-30 RX ADMIN — CARVEDILOL PHOSPHATE 25 MILLIGRAM(S): 80 CAPSULE, EXTENDED RELEASE ORAL at 17:49

## 2023-08-30 RX ADMIN — Medication 1 TABLET(S): at 14:04

## 2023-08-30 RX ADMIN — ATORVASTATIN CALCIUM 20 MILLIGRAM(S): 80 TABLET, FILM COATED ORAL at 21:17

## 2023-08-30 RX ADMIN — Medication 81 MILLIGRAM(S): at 17:49

## 2023-08-30 RX ADMIN — Medication 975 MILLIGRAM(S): at 07:54

## 2023-08-30 RX ADMIN — POLYETHYLENE GLYCOL 3350 17 GRAM(S): 17 POWDER, FOR SOLUTION ORAL at 21:17

## 2023-08-30 RX ADMIN — PANTOPRAZOLE SODIUM 40 MILLIGRAM(S): 20 TABLET, DELAYED RELEASE ORAL at 08:40

## 2023-08-30 RX ADMIN — SENNA PLUS 2 TABLET(S): 8.6 TABLET ORAL at 21:17

## 2023-08-30 RX ADMIN — Medication 1 TABLET(S): at 21:17

## 2023-08-30 RX ADMIN — Medication 500 MILLIGRAM(S): at 17:49

## 2023-08-30 NOTE — BRIEF OPERATIVE NOTE - OPERATION/FINDINGS
large cyst at the proximal aspect extending approx 70mm distal from plateau. significant varus deformity

## 2023-08-30 NOTE — PHYSICAL THERAPY INITIAL EVALUATION ADULT - GENERAL OBSERVATIONS, REHAB EVAL
Pt encountered sitting at edge of bed in no distress, +right knee immobilizer. As per orders, pt to keep immobilizer until POD#2. Blood pressure - 162/64

## 2023-08-30 NOTE — PHYSICAL THERAPY INITIAL EVALUATION ADULT - GAIT DEVIATIONS NOTED, PT EVAL
decreased roberto/increased time in double stance/decreased step length/decreased weight-shifting ability

## 2023-08-30 NOTE — DISCHARGE NOTE PROVIDER - NSDCFUSCHEDAPPT_GEN_ALL_CORE_FT
Josemanuel Reynolds  [facility]  ORTHOSURG [address] [facility]  Scheduled Appointment: 09/13/2023

## 2023-08-30 NOTE — DISCHARGE NOTE PROVIDER - NSDCCPTREATMENT_GEN_ALL_CORE_FT
PRINCIPAL PROCEDURE  Procedure: Total knee arthroplasty  Findings and Treatment: Pain control:    Standing:         -Acetaminophen 500mg - 2 tabs every 8 hours  As needed:        -Tramadol 50mg - 1 tab every 6 hours - Take only if needed for MODERATE pain       -oxycodone 5mg - 1 tab every 4-6 hours - Take only if needed for SEVERE or BREAKTHROUGH pain  Oxycodone and Tramadol have been sent to your pharmacy. Please do not drive, operate machinery, or make important decisions while taking these medications.   Other Medications: (Standing)  -Aspirin (Enteric Coated) 81mg every 12 hours - to prevent blood clots (for 6 weeks post operatively.)  -Protonix 40mg - 1 tab every 24 hours - to prevent stomach irritation/ulcers  -Senna 8.6mg - 2 pills every 24 hours - stool softener  -Miralax 17g - daily - constipation   Follow up: Please follow up at your prescheduled post-operative follow up appointment with Dr. Reynolds for 2 weeks after hospital discharge. Please call with any questions or concerns including fevers, worsening pain, pus from the wounds, redness of the skin and difficulty breathing or heaviness in the chest at [phone].

## 2023-08-30 NOTE — PHYSICAL THERAPY INITIAL EVALUATION ADULT - ACTIVE RANGE OF MOTION EXAMINATION, REHAB EVAL
right hip and knee active ROM WFL, right knee ~5-90 degrees/bilateral upper extremity Active ROM was WFL (within functional limits)/Left LE Active ROM was WFL (within functional limits)

## 2023-08-30 NOTE — ASU PREOP CHECKLIST - 4.
Dr. Reynolds made aware patient took baby aspirin this morning. Okay to proceed with scheduled surgery.

## 2023-08-30 NOTE — DISCHARGE NOTE PROVIDER - HOSPITAL COURSE
This is a 76yo Male with PMH of CAD, NSTEMI, HTN, tongue CA who presents [facility] for orthopedic surgery. Patient s/p right ruslan TKA with Dr. Reynolds on 8/30/2023. Patient tolerated the procedure well without any intraoperative complications. Patient tolerated physical therapy well, and the pain was controlled. Patient is weight bearing as tolerated with cane/walker as needed. Seen by medical attending for continuity of care and management and cleared for safe discharge. Keep dressing/incision clean, dry and intact. Any suture/staples to be removed on post-op day #14 at your office visit. Patient is on 81mg of ASA for DVT prophylaxis, please take for 6 weeks unless otherwise instructed by your surgeon. Please follow up with Dr. Reynolds in 2 weeks. Please follow up with your PMD for continuity of care and management as medications may have changed.

## 2023-08-30 NOTE — PHYSICAL THERAPY INITIAL EVALUATION ADULT - ADDITIONAL COMMENTS
Pt states he lives in a house with his wife, flight of steps to enter +chairlift. Prior to admission pt reports ambulating independently with a rolling walker and was independent in ADLs.     Following evaluation, pt was left semireclined in bed in no distress, call bell in reach.

## 2023-08-30 NOTE — DISCHARGE NOTE PROVIDER - NSDCMRMEDTOKEN_GEN_ALL_CORE_FT
amLODIPine 10 mg oral tablet: 1 tab(s) orally once a day  aspirin 81 mg oral tablet: 1 tab(s) orally once a day  Calcium 1200mg once a day:   carvedilol 25 mg oral tablet: 1 tab(s) orally 2 times a day  Margie-C 1000mg daily:   Iron 65mg daily:   Lipitor 20 mg oral tablet: 1 tab(s) orally once a day  Motrin 800 mg oral tablet: 1 tab(s) orally 2 times a day  Multiple Vitamins oral tablet: 1 tab(s) orally once a day  valsartan 320 mg oral tablet: 1 tab(s) orally once a day (at bedtime)  vitamin B-12 1000mcg once a day:   Vitamin D3 1250 mcg (50,000 intl units) oral capsule: 1 tab(s) orally once a week   acetaminophen 325 mg oral tablet: 2 tab(s) orally every 6 hours As needed Temp greater or equal to 38C (100.4F), Mild Pain (1 - 3)  amLODIPine 10 mg oral tablet: 1 tab(s) orally once a day  ascorbic acid 500 mg oral tablet: 1 tab(s) orally 2 times a day  aspirin 81 mg oral tablet: 1 tab(s) orally 2 times a day  Calcium 1200mg once a day:   carvedilol 25 mg oral tablet: 1 tab(s) orally 2 times a day  Margie-C 1000mg daily:   folic acid 1 mg oral tablet: 1 tab(s) orally once a day  Iron 65mg daily:   Lipitor 20 mg oral tablet: 1 tab(s) orally once a day  Multiple Vitamins oral tablet: 1 tab(s) orally once a day  oxyCODONE 5 mg oral tablet: 1 tab(s) orally every 3 hours as needed for Moderate-Severe Pain  pantoprazole 40 mg oral delayed release tablet: 1 tab(s) orally once a day (before a meal)  polyethylene glycol 3350 oral powder for reconstitution: 17 gram(s) orally once a day (at bedtime)  senna leaf extract oral tablet: 2 tab(s) orally once a day (at bedtime)  valsartan 320 mg oral tablet: 1 tab(s) orally once a day (at bedtime)  vitamin B-12 1000mcg once a day:   Vitamin D3 1250 mcg (50,000 intl units) oral capsule: 1 tab(s) orally once a week

## 2023-08-30 NOTE — DISCHARGE NOTE PROVIDER - CARE PROVIDER_API CALL
Josemanuel Reynolds  Orthopaedic Surgery  [address], [address]  [place] [address]  Phone: [phone]  Fax: [phone]  Established Patient  Follow Up Time:

## 2023-08-30 NOTE — BRIEF OPERATIVE NOTE - NSICDXBRIEFPREOP_GEN_ALL_CORE_FT
PRE-OP DIAGNOSIS:  Osteoarthritis of right knee 30-Aug-2023 12:22:04 with large tibial ganglion cyst Rhys Stern

## 2023-08-30 NOTE — BRIEF OPERATIVE NOTE - NSICDXBRIEFPOSTOP_GEN_ALL_CORE_FT
POST-OP DIAGNOSIS:  Osteoarthritis of right knee 30-Aug-2023 12:22:21 with large tibial ganglion cyst Rhys Stern

## 2023-08-30 NOTE — PROGRESS NOTE ADULT - SUBJECTIVE AND OBJECTIVE BOX
ORTHO POC      Patient resting comfortably without complaint s/p right TKA today     Vital Signs Last 24 Hrs  T(C): 36.3 (30 Aug 2023 14:00), Max: 36.9 (30 Aug 2023 07:42)  T(F): 97.4 (30 Aug 2023 14:00), Max: 98.4 (30 Aug 2023 07:42)  HR: 60 (30 Aug 2023 15:00) (55 - 63)  BP: 143/61 (30 Aug 2023 15:00) (115/70 - 154/55)  BP(mean): 83 (30 Aug 2023 15:00) (73 - 83)  RR: 18 (30 Aug 2023 15:00) (12 - 19)  SpO2: 97% (30 Aug 2023 15:00) (96% - 100%)    Parameters below as of 30 Aug 2023 14:00  Patient On (Oxygen Delivery Method): room air        right knee: dressing clean/dry/ intact   Ice pack in place           LE:  EHL/GC/TA 5/5                  sensory intact                   DP pulse 2+    Labs :                      12.4   8.63  )-----------( 143      ( 30 Aug 2023 12:48 )             36.0                 08-30    138  |  106  |  17  ----------------------------<  125<H>  4.5   |  21<L>  |  0.98    Ca    8.7      30 Aug 2023 12:48        U/O:  DTV    A/P: Stable postop            PT- WBAT            DVT prophylaxis- venodynes/ ASA BID           Pain Control            Incentive Spirometer            Antibiotics x 24 hr            Follow up AM labs   ORTHO POC      Patient resting comfortably without complaint s/p right TKA today     Vital Signs Last 24 Hrs  T(C): 36.3 (30 Aug 2023 14:00), Max: 36.9 (30 Aug 2023 07:42)  T(F): 97.4 (30 Aug 2023 14:00), Max: 98.4 (30 Aug 2023 07:42)  HR: 60 (30 Aug 2023 15:00) (55 - 63)  BP: 143/61 (30 Aug 2023 15:00) (115/70 - 154/55)  BP(mean): 83 (30 Aug 2023 15:00) (73 - 83)  RR: 18 (30 Aug 2023 15:00) (12 - 19)  SpO2: 97% (30 Aug 2023 15:00) (96% - 100%)    Parameters below as of 30 Aug 2023 14:00  Patient On (Oxygen Delivery Method): room air        right knee: dressing clean/dry/ intact   Ice pack in place           LE:  EHL/GC/TA 5/5                  sensory intact                   DP pulse 2+    Labs :                      12.4   8.63  )-----------( 143      ( 30 Aug 2023 12:48 )             36.0                 08-30    138  |  106  |  17  ----------------------------<  125<H>  4.5   |  21<L>  |  0.98    Ca    8.7      30 Aug 2023 12:48        U/O:  DTV    A/P: Stable postop            PT- WBAT in KI x 2D           DVT prophylaxis- venodynes/ ASA BID           Pain Control            Incentive Spirometer            Antibiotics x 24 hr            Follow up AM labs

## 2023-08-30 NOTE — DISCHARGE NOTE PROVIDER - NSDCCPCAREPLAN_GEN_ALL_CORE_FT
PRINCIPAL DISCHARGE DIAGNOSIS  Diagnosis: Unilateral primary osteoarthritis, right knee  Assessment and Plan of Treatment: Diet: Continue regular diet upon discharge.   Activity: No heavy lifting > 25 lbs for 4 weeks. Avoid straining or excessive activity x 6 weeks.   -Continue to use your walker when ambulating until your postoperative follow up appointment.   Dressings: Keep dressing clean, dry, and intact. Your doctor will remove your bandage at your post-operative follow up appointment.   Other Care:   -You may shower when you get home but DO NOT soak dressing and/or incision. The water may run over your dressing/incision but DO NOT let the water directly hit your dressing/incision (take a shower with your wound away from the direct stream of water). NO hot tubes, NO bath rubs, NO swimming pools.   -Elevate your operative leg 2 feet above heart level for 2 hours in the morning, 2 hours in the afternoon, and 2 hours in the evening.   -Apply ice for 20min every time you elevate.   -Sit for 90 min/day: 45mins x2 or 30min x3  -DO NOT sit for more than the 90min/day. Walk or lay down when not elevating your leg.   -DO NOT place the elevation pillow behind your knees. Only place it under your calf and heel.   -DO NOT bend more than 45 degrees at the waist

## 2023-08-31 ENCOUNTER — TRANSCRIPTION ENCOUNTER (OUTPATIENT)
Age: 78
End: 2023-08-31

## 2023-08-31 DIAGNOSIS — I25.10 ATHEROSCLEROTIC HEART DISEASE OF NATIVE CORONARY ARTERY WITHOUT ANGINA PECTORIS: ICD-10-CM

## 2023-08-31 DIAGNOSIS — D72.829 ELEVATED WHITE BLOOD CELL COUNT, UNSPECIFIED: ICD-10-CM

## 2023-08-31 LAB
ANION GAP SERPL CALC-SCNC: 13 MMOL/L — SIGNIFICANT CHANGE UP (ref 7–14)
BUN SERPL-MCNC: 22 MG/DL — SIGNIFICANT CHANGE UP (ref 7–23)
CALCIUM SERPL-MCNC: 9.1 MG/DL — SIGNIFICANT CHANGE UP (ref 8.4–10.5)
CHLORIDE SERPL-SCNC: 104 MMOL/L — SIGNIFICANT CHANGE UP (ref 98–107)
CO2 SERPL-SCNC: 20 MMOL/L — LOW (ref 22–31)
CREAT SERPL-MCNC: 1 MG/DL — SIGNIFICANT CHANGE UP (ref 0.5–1.3)
EGFR: 78 ML/MIN/1.73M2 — SIGNIFICANT CHANGE UP
GLUCOSE SERPL-MCNC: 178 MG/DL — HIGH (ref 70–99)
HCT VFR BLD CALC: 34.9 % — LOW (ref 39–50)
HGB BLD-MCNC: 12.4 G/DL — LOW (ref 13–17)
MCHC RBC-ENTMCNC: 32 PG — SIGNIFICANT CHANGE UP (ref 27–34)
MCHC RBC-ENTMCNC: 35.5 GM/DL — SIGNIFICANT CHANGE UP (ref 32–36)
MCV RBC AUTO: 90.2 FL — SIGNIFICANT CHANGE UP (ref 80–100)
NRBC # BLD: 0 /100 WBCS — SIGNIFICANT CHANGE UP (ref 0–0)
NRBC # FLD: 0 K/UL — SIGNIFICANT CHANGE UP (ref 0–0)
PLATELET # BLD AUTO: 140 K/UL — LOW (ref 150–400)
POTASSIUM SERPL-MCNC: 3.9 MMOL/L — SIGNIFICANT CHANGE UP (ref 3.5–5.3)
POTASSIUM SERPL-SCNC: 3.9 MMOL/L — SIGNIFICANT CHANGE UP (ref 3.5–5.3)
RBC # BLD: 3.87 M/UL — LOW (ref 4.2–5.8)
RBC # FLD: 12.6 % — SIGNIFICANT CHANGE UP (ref 10.3–14.5)
SODIUM SERPL-SCNC: 137 MMOL/L — SIGNIFICANT CHANGE UP (ref 135–145)
WBC # BLD: 17.31 K/UL — HIGH (ref 3.8–10.5)
WBC # FLD AUTO: 17.31 K/UL — HIGH (ref 3.8–10.5)

## 2023-08-31 PROCEDURE — 99223 1ST HOSP IP/OBS HIGH 75: CPT

## 2023-08-31 RX ORDER — LANOLIN ALCOHOL/MO/W.PET/CERES
6 CREAM (GRAM) TOPICAL AT BEDTIME
Refills: 0 | Status: DISCONTINUED | OUTPATIENT
Start: 2023-08-31 | End: 2023-09-01

## 2023-08-31 RX ADMIN — OXYCODONE HYDROCHLORIDE 10 MILLIGRAM(S): 5 TABLET ORAL at 16:31

## 2023-08-31 RX ADMIN — Medication 81 MILLIGRAM(S): at 05:12

## 2023-08-31 RX ADMIN — Medication 1 TABLET(S): at 05:11

## 2023-08-31 RX ADMIN — ATORVASTATIN CALCIUM 20 MILLIGRAM(S): 80 TABLET, FILM COATED ORAL at 21:28

## 2023-08-31 RX ADMIN — Medication 81 MILLIGRAM(S): at 17:36

## 2023-08-31 RX ADMIN — OXYCODONE HYDROCHLORIDE 10 MILLIGRAM(S): 5 TABLET ORAL at 12:24

## 2023-08-31 RX ADMIN — Medication 500 MILLIGRAM(S): at 05:11

## 2023-08-31 RX ADMIN — OXYCODONE HYDROCHLORIDE 5 MILLIGRAM(S): 5 TABLET ORAL at 06:05

## 2023-08-31 RX ADMIN — SODIUM CHLORIDE 3 MILLILITER(S): 9 INJECTION INTRAMUSCULAR; INTRAVENOUS; SUBCUTANEOUS at 05:19

## 2023-08-31 RX ADMIN — Medication 1 TABLET(S): at 13:18

## 2023-08-31 RX ADMIN — SENNA PLUS 2 TABLET(S): 8.6 TABLET ORAL at 21:28

## 2023-08-31 RX ADMIN — Medication 1 TABLET(S): at 13:17

## 2023-08-31 RX ADMIN — Medication 30 MILLILITER(S): at 15:32

## 2023-08-31 RX ADMIN — PANTOPRAZOLE SODIUM 40 MILLIGRAM(S): 20 TABLET, DELAYED RELEASE ORAL at 05:12

## 2023-08-31 RX ADMIN — SODIUM CHLORIDE 3 MILLILITER(S): 9 INJECTION INTRAMUSCULAR; INTRAVENOUS; SUBCUTANEOUS at 21:53

## 2023-08-31 RX ADMIN — Medication 1 MILLIGRAM(S): at 13:17

## 2023-08-31 RX ADMIN — OXYCODONE HYDROCHLORIDE 5 MILLIGRAM(S): 5 TABLET ORAL at 05:11

## 2023-08-31 RX ADMIN — OXYCODONE HYDROCHLORIDE 10 MILLIGRAM(S): 5 TABLET ORAL at 15:31

## 2023-08-31 RX ADMIN — OXYCODONE HYDROCHLORIDE 10 MILLIGRAM(S): 5 TABLET ORAL at 11:24

## 2023-08-31 RX ADMIN — Medication 100 MILLIGRAM(S): at 05:12

## 2023-08-31 RX ADMIN — Medication 6 MILLIGRAM(S): at 21:28

## 2023-08-31 RX ADMIN — SODIUM CHLORIDE 3 MILLILITER(S): 9 INJECTION INTRAMUSCULAR; INTRAVENOUS; SUBCUTANEOUS at 13:13

## 2023-08-31 RX ADMIN — Medication 1 TABLET(S): at 21:28

## 2023-08-31 RX ADMIN — VALSARTAN 320 MILLIGRAM(S): 80 TABLET ORAL at 17:35

## 2023-08-31 RX ADMIN — CARVEDILOL PHOSPHATE 25 MILLIGRAM(S): 80 CAPSULE, EXTENDED RELEASE ORAL at 17:35

## 2023-08-31 RX ADMIN — ONDANSETRON 4 MILLIGRAM(S): 8 TABLET, FILM COATED ORAL at 18:12

## 2023-08-31 RX ADMIN — Medication 500 MILLIGRAM(S): at 17:36

## 2023-08-31 NOTE — CONSULT NOTE ADULT - ASSESSMENT
77M hx of CAD s/p stents (2019), remote tongue CA (s/p resection), HTN, OA presenting for planned R TKA.

## 2023-08-31 NOTE — OCCUPATIONAL THERAPY INITIAL EVALUATION ADULT - MD ORDER
Occupational Therapy (OT) to evaluate and treat. Out of Bed to Chair. Per WAYNE Vu, pt is okay to participate in OT evaluation and perform activity as tolerated.

## 2023-08-31 NOTE — CONSULT NOTE ADULT - PROBLEM SELECTOR RECOMMENDATION 4
Post-op labs reviewed, WBC = 17   - Suspect reactive in post-op state  - No signs or symptoms of infection  - Monitor off antibiotics  - Monitor CBC
Passed

## 2023-08-31 NOTE — OCCUPATIONAL THERAPY INITIAL EVALUATION ADULT - GENERAL OBSERVATIONS, REHAB EVAL
Pt. received semisupine in bed. No acute distress. Patient agreed to evaluation from Occupational Therapist. +IV, +Clean dry intact dressing to Right Knee, +Right Knee Immobilizer.

## 2023-08-31 NOTE — PROGRESS NOTE ADULT - SUBJECTIVE AND OBJECTIVE BOX
ANESTHESIA POSTOP NOTE  77y Male POSTOP DAY 1  No complaints  Vital Signs Last 24 Hrs  T(C): 37.3 (31 Aug 2023 09:57), Max: 37.3 (31 Aug 2023 09:57)  T(F): 99.2 (31 Aug 2023 09:57), Max: 99.2 (31 Aug 2023 09:57)  HR: 74 (31 Aug 2023 09:57) (55 - 75)  BP: 145/55 (31 Aug 2023 09:57) (115/70 - 162/64)  BP(mean): 82 (30 Aug 2023 16:00) (73 - 83)  RR: 18 (31 Aug 2023 09:57) (12 - 20)  SpO2: 96% (31 Aug 2023 09:57) (94% - 99%)    Parameters below as of 31 Aug 2023 09:57  Patient On (Oxygen Delivery Method): room air      I&O's Summary    30 Aug 2023 07:01  -  31 Aug 2023 07:00  --------------------------------------------------------  IN: 315 mL / OUT: 1025 mL / NET: -710 mL    31 Aug 2023 07:01  -  31 Aug 2023 11:02  --------------------------------------------------------  IN: 0 mL / OUT: 200 mL / NET: -200 mL        [ X ] NO APPARENT ANESTHESIA COMPLICATIONS      Comments:

## 2023-08-31 NOTE — CONSULT NOTE ADULT - PROBLEM SELECTOR RECOMMENDATION 9
S/p R TKA on 8/30   - care per primary orthopedic team   - multimodal pain control + bowel regimen   - PT/OT  - Encourage OOB   - Encourage incentive spirometry   - DVT ppx: ASA 81mg BID per orthopedic protocol   - Post-op labs reviewed

## 2023-08-31 NOTE — OCCUPATIONAL THERAPY INITIAL EVALUATION ADULT - RANGE OF MOTION EXAMINATION, LOWER EXTREMITY
except Right Knee Flexion/Extension limited secondary to surgery, +Knee Immobilzer/bilateral LE Active ROM was WFL  (within functional limits)

## 2023-08-31 NOTE — OCCUPATIONAL THERAPY INITIAL EVALUATION ADULT - BATHING, PREVIOUS LEVEL OF FUNCTION, OT EVAL
Pt. reports he has been having to sponge-bathe in recent weeks secondary to increased difficulty./independent

## 2023-08-31 NOTE — OCCUPATIONAL THERAPY INITIAL EVALUATION ADULT - LIVES WITH, PROFILE
Pt. reports he lives in a house with no steps to enter. Once inside, pt. reports he has a chair lift available to reach 2nd floor where main bedroom and bathroom are located. Per pt., he has a bathtub in his bathroom. Pt. states his wife has been living at a nursing home for about the last 9 months.

## 2023-08-31 NOTE — CONSULT NOTE ADULT - SUBJECTIVE AND OBJECTIVE BOX
Patient is a 77y old  Male who presents with a chief complaint of s/p right ruslan TKA (30 Aug 2023 15:24)      HPI:  77M hx of CAD s/p stents (2019), remote tongue CA (s/p resection), HTN, OA presenting for planned R TKA.     Patient seen and examined POD #1. Reports his pain is well controlled currently and he already worked with PT this morning. Denies chest pain, SOB, fevers, chills. Denies nausea/vomiting. Has not passed gas yet, but is urinating without issues.     All: No Known Allergies    HOME MEDICATIONS: [X] Reviewed    MEDICATIONS  (STANDING):  amLODIPine   Tablet 10 milliGRAM(s) Oral daily  ascorbic acid 500 milliGRAM(s) Oral two times a day  aspirin 81 milliGRAM(s) Oral two times a day  atorvastatin 20 milliGRAM(s) Oral at bedtime  calcium carbonate 1250 mG  + Vitamin D (OsCal 500 + D) 1 Tablet(s) Oral three times a day  carvedilol 25 milliGRAM(s) Oral every 12 hours  chlorhexidine 2% Cloths 1 Application(s) Topical daily  folic acid 1 milliGRAM(s) Oral daily  lactated ringers. 1000 milliLiter(s) (75 mL/Hr) IV Continuous <Continuous>  multivitamin 1 Tablet(s) Oral daily  pantoprazole    Tablet 40 milliGRAM(s) Oral before breakfast  polyethylene glycol 3350 17 Gram(s) Oral at bedtime  senna 2 Tablet(s) Oral at bedtime  sodium chloride 0.9% lock flush 3 milliLiter(s) IV Push every 8 hours  valsartan 320 milliGRAM(s) Oral daily    MEDICATIONS  (PRN):  acetaminophen     Tablet .. 650 milliGRAM(s) Oral every 6 hours PRN Temp greater or equal to 38C (100.4F), Mild Pain (1 - 3)  aluminum hydroxide/magnesium hydroxide/simethicone Suspension 30 milliLiter(s) Oral four times a day PRN Indigestion  magnesium hydroxide Suspension 30 milliLiter(s) Oral daily PRN Constipation  ondansetron Injectable 4 milliGRAM(s) IV Push every 6 hours PRN Nausea and/or Vomiting  oxyCODONE    IR 5 milliGRAM(s) Oral every 3 hours PRN Moderate Pain (4 - 6)  oxyCODONE    IR 10 milliGRAM(s) Oral every 3 hours PRN Severe Pain (7 - 10)      PAST MEDICAL & SURGICAL HISTORY:  Unilateral primary osteoarthritis, right knee  CAD (coronary artery disease)  HTN (hypertension)  H/O tongue cancer  Stented coronary artery  H/O non-ST elevation myocardial infarction (NSTEMI)  HLD (hyperlipidemia)    S/P appendectomy  S/P cardiac catheterization  [X] Reviewed     SOCIAL HISTORY:  Residence: [ ] [facility]  [ ] SNF  [X] Community  [X] Substance abuse: denies    [X] Tobacco: denies   [X] Alcohol use: denies     FAMILY HISTORY:  FH: heart attack (Father)    REVIEW OF SYSTEMS:    CONSTITUTIONAL: No fever, weight loss, or fatigue  EYES: No eye pain, visual disturbances, or discharge  ENMT:  No difficulty hearing, tinnitus, vertigo; No sinus or throat pain  NECK: No pain or stiffness  BREASTS: No pain, masses, or nipple discharge  RESPIRATORY: No cough, wheezing, chills or hemoptysis; No shortness of breath  CARDIOVASCULAR: No chest pain, palpitations, dizziness, or leg swelling  GASTROINTESTINAL: No abdominal or epigastric pain. No nausea, vomiting, or hematemesis; No diarrhea or constipation. No melena or hematochezia.  GENITOURINARY: No dysuria, frequency, hematuria, or incontinence  NEUROLOGICAL: No headaches, memory loss, loss of strength, numbness, or tremors  SKIN: No itching, burning, rashes, or lesions   LYMPH NODES: No enlarged glands  ENDOCRINE: No heat or cold intolerance; No hair loss  MUSCULOSKELETAL: No muscle or back pain  PSYCHIATRIC: No depression, anxiety, mood swings, or difficulty sleeping  HEME/LYMPH: No easy bruising, or bleeding gums  ALLERGY AND IMMUNOLOGIC: No hives or eczema    [ X ] All other ROS negative  [  ] Unable to obtain due to poor mental status    Vital Signs Last 24 Hrs  T(C): 37.3 (31 Aug 2023 09:57), Max: 37.3 (31 Aug 2023 09:57)  T(F): 99.2 (31 Aug 2023 09:57), Max: 99.2 (31 Aug 2023 09:57)  HR: 74 (31 Aug 2023 09:57) (57 - 75)  BP: 145/55 (31 Aug 2023 09:57) (115/70 - 162/64)  BP(mean): 82 (30 Aug 2023 16:00) (73 - 83)  RR: 18 (31 Aug 2023 09:57) (13 - 20)  SpO2: 96% (31 Aug 2023 09:57) (94% - 99%)    Parameters below as of 31 Aug 2023 09:57  Patient On (Oxygen Delivery Method): room air      PHYSICAL EXAM:  GENERAL: NAD, well-groomed, well-developed  HEAD:  Atraumatic, Normocephalic  EYES: Conjunctiva and sclera clear  ENMT: Moist mucous membranes  RESPIRATORY: Clear to auscultation bilaterally; No rales, rhonchi, wheezing, or rubs  CARDIOVASCULAR: Regular rate and rhythm; No murmurs, rubs, or gallops  GASTROINTESTINAL: Soft, Nontender, Nondistended; Bowel sounds present  GENITOURINARY: Not examined  EXTREMITIES:  RLE in immobilizer, able to abduct and adduct   NERVOUS SYSTEM:  Alert & Oriented X3; Moving all 4 extremities; No gross sensory deficits      LABS:                        12.4   17.31 )-----------( 140      ( 31 Aug 2023 05:34 )             34.9     Hemoglobin: 12.4 g/dL (08-31 @ 05:34)  Hemoglobin: 12.4 g/dL (08-30 @ 12:48)    08-31    137  |  104  |  22  ----------------------------<  178<H>  3.9   |  20<L>  |  1.00    Ca    9.1      31 Aug 2023 05:34        Urinalysis Basic - ( 31 Aug 2023 05:34 )    Color: x / Appearance: x / SG: x / pH: x  Gluc: 178 mg/dL / Ketone: x  / Bili: x / Urobili: x   Blood: x / Protein: x / Nitrite: x   Leuk Esterase: x / RBC: x / WBC x   Sq Epi: x / Non Sq Epi: x / Bacteria: x      CAPILLARY BLOOD GLUCOSE      POCT Blood Glucose.: 123 mg/dL (30 Aug 2023 08:35)              [X] Consultant(s) Notes Reviewed  [X] Care Discussed with Consultants/Other Providers: Ortho PA  Patient is a 77y old  Male who presents with a chief complaint of s/p right ruslan TKA (30 Aug 2023 15:24)      HPI:  77M hx of CAD s/p stents (2019), remote tongue CA (s/p resection), HTN, OA presenting for planned R TKA.     Patient seen and examined POD #1. Reports his pain is well controlled currently and he already worked with PT this morning. Denies chest pain, SOB, fevers, chills. Denies nausea/vomiting. Has not passed gas yet, but is urinating without issues.     All: No Known Allergies    HOME MEDICATIONS: [X] Reviewed  · 	aspirin 81 mg oral tablet: 1 tab(s) orally once a day  · 	amLODIPine 10 mg oral tablet: 1 tab(s) orally once a day  · 	Vitamin D3 1250 mcg (50,000 intl units) oral capsule: 1 tab(s) orally once a week  · 	carvedilol 25 mg oral tablet: 1 tab(s) orally 2 times a day  · 	Lipitor 20 mg oral tablet: 1 tab(s) orally once a day  · 	valsartan 320 mg oral tablet:  1 tab(s) orally once a day (at bedtime)  · 	vitamin B-12 1000mcg once a day:  · 	Margie-C 1000mg daily:   · 	Iron 65mg daily:   · 	Calcium 1200mg once a day:     MEDICATIONS  (STANDING):  amLODIPine   Tablet 10 milliGRAM(s) Oral daily  ascorbic acid 500 milliGRAM(s) Oral two times a day  aspirin 81 milliGRAM(s) Oral two times a day  atorvastatin 20 milliGRAM(s) Oral at bedtime  calcium carbonate 1250 mG  + Vitamin D (OsCal 500 + D) 1 Tablet(s) Oral three times a day  carvedilol 25 milliGRAM(s) Oral every 12 hours  chlorhexidine 2% Cloths 1 Application(s) Topical daily  folic acid 1 milliGRAM(s) Oral daily  lactated ringers. 1000 milliLiter(s) (75 mL/Hr) IV Continuous <Continuous>  multivitamin 1 Tablet(s) Oral daily  pantoprazole    Tablet 40 milliGRAM(s) Oral before breakfast  polyethylene glycol 3350 17 Gram(s) Oral at bedtime  senna 2 Tablet(s) Oral at bedtime  sodium chloride 0.9% lock flush 3 milliLiter(s) IV Push every 8 hours  valsartan 320 milliGRAM(s) Oral daily    MEDICATIONS  (PRN):  acetaminophen     Tablet .. 650 milliGRAM(s) Oral every 6 hours PRN Temp greater or equal to 38C (100.4F), Mild Pain (1 - 3)  aluminum hydroxide/magnesium hydroxide/simethicone Suspension 30 milliLiter(s) Oral four times a day PRN Indigestion  magnesium hydroxide Suspension 30 milliLiter(s) Oral daily PRN Constipation  ondansetron Injectable 4 milliGRAM(s) IV Push every 6 hours PRN Nausea and/or Vomiting  oxyCODONE    IR 5 milliGRAM(s) Oral every 3 hours PRN Moderate Pain (4 - 6)  oxyCODONE    IR 10 milliGRAM(s) Oral every 3 hours PRN Severe Pain (7 - 10)      PAST MEDICAL & SURGICAL HISTORY:  Unilateral primary osteoarthritis, right knee  CAD (coronary artery disease)  HTN (hypertension)  H/O tongue cancer  Stented coronary artery  H/O non-ST elevation myocardial infarction (NSTEMI)  HLD (hyperlipidemia)    S/P appendectomy  S/P cardiac catheterization  [X] Reviewed     SOCIAL HISTORY:  Residence: [ ] [facility]  [ ] [facility]  [X] Community  [X] Substance abuse: denies    [X] Tobacco: denies   [X] Alcohol use: denies     FAMILY HISTORY:  FH: heart attack (Father)    REVIEW OF SYSTEMS:    CONSTITUTIONAL: No fever, weight loss, or fatigue  EYES: No eye pain, visual disturbances, or discharge  ENMT:  No difficulty hearing, tinnitus, vertigo; No sinus or throat pain  NECK: No pain or stiffness  BREASTS: No pain, masses, or nipple discharge  RESPIRATORY: No cough, wheezing, chills or hemoptysis; No shortness of breath  CARDIOVASCULAR: No chest pain, palpitations, dizziness, or leg swelling  GASTROINTESTINAL: No abdominal or epigastric pain. No nausea, vomiting, or hematemesis; No diarrhea or constipation. No melena or hematochezia.  GENITOURINARY: No dysuria, frequency, hematuria, or incontinence  NEUROLOGICAL: No headaches, memory loss, loss of strength, numbness, or tremors  SKIN: No itching, burning, rashes, or lesions   LYMPH NODES: No enlarged glands  ENDOCRINE: No heat or cold intolerance; No hair loss  MUSCULOSKELETAL: No muscle or back pain  PSYCHIATRIC: No depression, anxiety, mood swings, or difficulty sleeping  HEME/LYMPH: No easy bruising, or bleeding gums  ALLERGY AND IMMUNOLOGIC: No hives or eczema    [ X ] All other ROS negative  [  ] Unable to obtain due to poor mental status    Vital Signs Last 24 Hrs  T(C): 37.3 (31 Aug 2023 09:57), Max: 37.3 (31 Aug 2023 09:57)  T(F): 99.2 (31 Aug 2023 09:57), Max: 99.2 (31 Aug 2023 09:57)  HR: 74 (31 Aug 2023 09:57) (57 - 75)  BP: 145/55 (31 Aug 2023 09:57) (115/70 - 162/64)  BP(mean): 82 (30 Aug 2023 16:00) (73 - 83)  RR: 18 (31 Aug 2023 09:57) (13 - 20)  SpO2: 96% (31 Aug 2023 09:57) (94% - 99%)    Parameters below as of 31 Aug 2023 09:57  Patient On (Oxygen Delivery Method): room air      PHYSICAL EXAM:  GENERAL: NAD, well-groomed, well-developed  HEAD:  Atraumatic, Normocephalic  EYES: Conjunctiva and sclera clear  ENMT: Moist mucous membranes  RESPIRATORY: Clear to auscultation bilaterally; No rales, rhonchi, wheezing, or rubs  CARDIOVASCULAR: Regular rate and rhythm; No murmurs, rubs, or gallops  GASTROINTESTINAL: Soft, Nontender, Nondistended; Bowel sounds present  GENITOURINARY: Not examined  EXTREMITIES:  RLE in immobilizer, able to abduct and adduct   NERVOUS SYSTEM:  Alert & Oriented X3; Moving all 4 extremities; No gross sensory deficits      LABS:                        12.4   17.31 )-----------( 140      ( 31 Aug 2023 05:34 )             34.9     Hemoglobin: 12.4 g/dL (08-31 @ 05:34)  Hemoglobin: 12.4 g/dL (08-30 @ 12:48)    08-31    137  |  104  |  22  ----------------------------<  178<H>  3.9   |  20<L>  |  1.00    Ca    9.1      31 Aug 2023 05:34        Urinalysis Basic - ( 31 Aug 2023 05:34 )    Color: x / Appearance: x / SG: x / pH: x  Gluc: 178 mg/dL / Ketone: x  / Bili: x / Urobili: x   Blood: x / Protein: x / Nitrite: x   Leuk Esterase: x / RBC: x / WBC x   Sq Epi: x / Non Sq Epi: x / Bacteria: x      CAPILLARY BLOOD GLUCOSE      POCT Blood Glucose.: 123 mg/dL (30 Aug 2023 08:35)              [X] Consultant(s) Notes Reviewed  [X] Care Discussed with Consultants/Other Providers: Ortho PA

## 2023-08-31 NOTE — DISCHARGE NOTE NURSING/CASE MANAGEMENT/SOCIAL WORK - NSDCPEFALRISK_GEN_ALL_CORE
For information on Fall & Injury Prevention, visit: [email or web address] OR  [email or web address] OR  https://www.cdc.gov/steadi/patient.html

## 2023-08-31 NOTE — DISCHARGE NOTE NURSING/CASE MANAGEMENT/SOCIAL WORK - PATIENT PORTAL LINK FT
You can access the FollowMyHealth Patient Portal offered by [facility] by registering at the following website: [email or web address]. By joining [employer]’s FollowMyHealth portal, you will also be able to view your health information using other applications (apps) compatible with our system.

## 2023-08-31 NOTE — DISCHARGE NOTE NURSING/CASE MANAGEMENT/SOCIAL WORK - NSDPDISTO_GEN_ALL_CORE
Home with home care Pt incision with dressing intact, cold compression, positive NV status. VS stable Afebrile. pt aubrey po diet, voiding without difficulty./Sub-Acute rehab

## 2023-08-31 NOTE — DISCHARGE NOTE NURSING/CASE MANAGEMENT/SOCIAL WORK - NSDCPNINST_GEN_ALL_CORE
make a postop appointment with Dr Newell, please keep postop dressing in place until this appointment.  Notify Dr Newell if you experience any increase in pain not relieved with medication, any redness, drainage or swelling around incision or any fever >100.5.  Drink plenty of fluids.  No heavy lifting or straining.  Continue to elevate your leg, do exercises and apply cold therapy as instructed. Use over the counter stool softeners to assist with constipation which can be a side effect of narcotic pain medication.  make a postop appointment with Dr Reynolds, please keep postop dressing in place until this appointment.  Notify Dr Newell if you experience any increase in pain not relieved with medication, any redness, drainage or swelling around incision or any fever >100.5.  Drink plenty of fluids.  No heavy lifting or straining.  Continue to elevate your leg, do exercises and apply cold therapy as instructed. Use over the counter stool softeners to assist with constipation which can be a side effect of narcotic pain medication.  You have a postop appointment with Dr Reynolds on 9/13/2023 @ 11:30  [facility],  please keep postop dressing in place until this appointment.  Notify Dr Newell if you experience any increase in pain not relieved with medication, any redness, drainage or swelling around incision or any fever >100.5.  Drink plenty of fluids.  No heavy lifting or straining.  Continue to elevate your leg, do exercises and apply cold therapy as instructed. Use over the counter stool softeners to assist with constipation which can be a side effect of narcotic pain medication.

## 2023-08-31 NOTE — DISCHARGE NOTE NURSING/CASE MANAGEMENT/SOCIAL WORK - NSDCPECAREGIVERED_GEN_ALL_CORE
Medline and carenotes for surgical procedure TKR, Incision Care, pain management, Oxy IR,  as well as DC Medications and side effects literature for patient reference.

## 2023-08-31 NOTE — PROGRESS NOTE ADULT - SUBJECTIVE AND OBJECTIVE BOX
Ortho Progress Note    S: Patient seen and examined. No acute events overnight. Pain well controlled with current regimen. Denies lightheadedness/dizziness, CP/SOB. Tolerating diet.       O:  Physical Exam:  Gen: Laying in bed, NAD, alert and oriented.   Resp: Unlabored breathing  RLE: Dressing CDI, ACE/KI intact          EHL/FHL/TA/Sol intact          + SILT DP/SP/AKBAR/Sa/Tib          +DP, extremity WWP    Vital Signs Last 24 Hrs  T(C): 36.9 (31 Aug 2023 05:18), Max: 36.9 (30 Aug 2023 07:42)  T(F): 98.5 (31 Aug 2023 05:18), Max: 98.5 (31 Aug 2023 05:18)  HR: 75 (31 Aug 2023 05:18) (55 - 75)  BP: 135/57 (31 Aug 2023 05:18) (115/70 - 162/64)  BP(mean): 82 (30 Aug 2023 16:00) (73 - 83)  RR: 18 (31 Aug 2023 05:18) (12 - 20)  SpO2: 98% (31 Aug 2023 05:18) (94% - 100%)    Parameters below as of 31 Aug 2023 05:18  Patient On (Oxygen Delivery Method): room air                              12.4   8.63  )-----------( 143      ( 30 Aug 2023 12:48 )             36.0       08-30    138  |  106  |  17  ----------------------------<  125<H>  4.5   |  21<L>  |  0.98          A/P: 77M POD1 s/p R TKA, doing well  - Pain control  - WBAT in  (will remove KI on POD2)  - PT/OT  - A81 BID for DVT ppx  - Dispo Rehab

## 2023-08-31 NOTE — OCCUPATIONAL THERAPY INITIAL EVALUATION ADULT - PERTINENT HX OF CURRENT PROBLEM, REHAB EVAL
Pt is a 77 year old male who reports bilateral knee pain, worse on the right then the left knee for many years. Pt has tried conservative measures (steroid injections and physical therapy) with no relief. Xray of the bilateral knees revealed significant arthritis. Pt is now s/p Right Total knee arthroplasty on 8/30/23.

## 2023-09-01 VITALS
RESPIRATION RATE: 18 BRPM | HEART RATE: 84 BPM | OXYGEN SATURATION: 96 % | SYSTOLIC BLOOD PRESSURE: 157 MMHG | TEMPERATURE: 99 F | DIASTOLIC BLOOD PRESSURE: 58 MMHG

## 2023-09-01 RX ORDER — ASPIRIN/CALCIUM CARB/MAGNESIUM 324 MG
1 TABLET ORAL
Qty: 0 | Refills: 0 | DISCHARGE

## 2023-09-01 RX ORDER — POLYETHYLENE GLYCOL 3350 17 G/17G
17 POWDER, FOR SOLUTION ORAL
Qty: 0 | Refills: 0 | DISCHARGE
Start: 2023-09-01

## 2023-09-01 RX ORDER — IBUPROFEN 200 MG
1 TABLET ORAL
Refills: 0 | DISCHARGE

## 2023-09-01 RX ORDER — ACETAMINOPHEN 500 MG
2 TABLET ORAL
Qty: 0 | Refills: 0 | DISCHARGE
Start: 2023-09-01

## 2023-09-01 RX ORDER — SENNA PLUS 8.6 MG/1
2 TABLET ORAL
Qty: 0 | Refills: 0 | DISCHARGE
Start: 2023-09-01

## 2023-09-01 RX ORDER — ASCORBIC ACID 60 MG
1 TABLET,CHEWABLE ORAL
Qty: 0 | Refills: 0 | DISCHARGE
Start: 2023-09-01

## 2023-09-01 RX ORDER — PANTOPRAZOLE SODIUM 20 MG/1
1 TABLET, DELAYED RELEASE ORAL
Qty: 0 | Refills: 0 | DISCHARGE
Start: 2023-09-01

## 2023-09-01 RX ORDER — FOLIC ACID 0.8 MG
1 TABLET ORAL
Qty: 0 | Refills: 0 | DISCHARGE
Start: 2023-09-01

## 2023-09-01 RX ORDER — OXYCODONE HYDROCHLORIDE 5 MG/1
1 TABLET ORAL
Qty: 0 | Refills: 0 | DISCHARGE
Start: 2023-09-01

## 2023-09-01 RX ADMIN — Medication 81 MILLIGRAM(S): at 05:23

## 2023-09-01 RX ADMIN — Medication 1 TABLET(S): at 12:48

## 2023-09-01 RX ADMIN — SODIUM CHLORIDE 3 MILLILITER(S): 9 INJECTION INTRAMUSCULAR; INTRAVENOUS; SUBCUTANEOUS at 05:28

## 2023-09-01 RX ADMIN — AMLODIPINE BESYLATE 10 MILLIGRAM(S): 2.5 TABLET ORAL at 05:24

## 2023-09-01 RX ADMIN — PANTOPRAZOLE SODIUM 40 MILLIGRAM(S): 20 TABLET, DELAYED RELEASE ORAL at 05:23

## 2023-09-01 RX ADMIN — Medication 500 MILLIGRAM(S): at 05:24

## 2023-09-01 RX ADMIN — Medication 1 TABLET(S): at 05:24

## 2023-09-01 RX ADMIN — CARVEDILOL PHOSPHATE 25 MILLIGRAM(S): 80 CAPSULE, EXTENDED RELEASE ORAL at 05:24

## 2023-09-01 RX ADMIN — OXYCODONE HYDROCHLORIDE 10 MILLIGRAM(S): 5 TABLET ORAL at 17:29

## 2023-09-01 RX ADMIN — OXYCODONE HYDROCHLORIDE 10 MILLIGRAM(S): 5 TABLET ORAL at 12:48

## 2023-09-01 RX ADMIN — ONDANSETRON 4 MILLIGRAM(S): 8 TABLET, FILM COATED ORAL at 10:51

## 2023-09-01 RX ADMIN — OXYCODONE HYDROCHLORIDE 10 MILLIGRAM(S): 5 TABLET ORAL at 13:48

## 2023-09-01 RX ADMIN — Medication 1 MILLIGRAM(S): at 12:48

## 2023-09-01 RX ADMIN — SODIUM CHLORIDE 3 MILLILITER(S): 9 INJECTION INTRAMUSCULAR; INTRAVENOUS; SUBCUTANEOUS at 13:41

## 2023-09-01 RX ADMIN — Medication 1 TABLET(S): at 13:45

## 2023-09-01 NOTE — PROGRESS NOTE ADULT - SUBJECTIVE AND OBJECTIVE BOX
Ortho Progress Note    S: Patient seen and examined. No acute events overnight. Pain well controlled with current regimen. Denies lightheadedness/dizziness, CP/SOB. Tolerating diet.       O:  Physical Exam:  Gen: Laying in bed, NAD, alert and oriented.   Resp: Unlabored breathing  LLE: Dressing CDI,           EHL/FHL/TA/Sol intact          + SILT DP/SP/AKBAR/Sa/Tib          +DP, extremity WWP    Vital Signs Last 24 Hrs  T(C): 37.4 (01 Sep 2023 05:27), Max: 37.9 (31 Aug 2023 17:13)  T(F): 99.3 (01 Sep 2023 05:27), Max: 100.3 (31 Aug 2023 17:13)  HR: 79 (01 Sep 2023 05:27) (74 - 80)  BP: 145/63 (01 Sep 2023 05:27) (143/59 - 178/53)  BP(mean): --  RR: 18 (01 Sep 2023 05:27) (18 - 18)  SpO2: 95% (01 Sep 2023 05:27) (95% - 97%)    Parameters below as of 01 Sep 2023 05:27  Patient On (Oxygen Delivery Method): room air                              12.4   17.31 )-----------( 140      ( 31 Aug 2023 05:34 )             34.9                         12.4   8.63  )-----------( 143      ( 30 Aug 2023 12:48 )             36.0       08-31    137  |  104  |  22  ----------------------------<  178<H>  3.9   |  20<L>  |  1.00              A/P: 77M POD2 s/p R TKA, doing well  - Pain control  - WBAT  - PT/OT  - A81 BID for DVT ppx  - Dispo Rehab (ARMANDO)

## 2023-09-13 ENCOUNTER — APPOINTMENT (OUTPATIENT)
Dept: ORTHOPEDIC SURGERY | Facility: CLINIC | Age: 78
End: 2023-09-13
Payer: MEDICARE

## 2023-09-13 PROCEDURE — 99024 POSTOP FOLLOW-UP VISIT: CPT

## 2023-10-17 ENCOUNTER — APPOINTMENT (OUTPATIENT)
Dept: ORTHOPEDIC SURGERY | Facility: CLINIC | Age: 78
End: 2023-10-17
Payer: MEDICARE

## 2023-10-17 PROCEDURE — 73562 X-RAY EXAM OF KNEE 3: CPT | Mod: RT

## 2023-10-17 PROCEDURE — 99024 POSTOP FOLLOW-UP VISIT: CPT

## 2023-12-19 ENCOUNTER — APPOINTMENT (OUTPATIENT)
Dept: ORTHOPEDIC SURGERY | Facility: CLINIC | Age: 78
End: 2023-12-19
Payer: MEDICARE

## 2023-12-19 PROCEDURE — 99213 OFFICE O/P EST LOW 20 MIN: CPT

## 2023-12-19 PROCEDURE — 73562 X-RAY EXAM OF KNEE 3: CPT | Mod: 50

## 2023-12-19 NOTE — HISTORY OF PRESENT ILLNESS
[___ Months Post Op] : [unfilled] months post op [0] : no pain reported [Clean/Dry/Intact] : clean, dry and intact [Neuro Intact] : an unremarkable neurological exam [Vascular Intact] : ~T peripheral vascular exam normal [Negative Nael's] : maneuvers demonstrated a negative Nael's sign [Doing Well] : is doing well [Excellent Pain Control] : has excellent pain control [No Sign of Infection] : is showing no signs of infection [Swelling] : not swollen [de-identified] : 8/30/2023 - Resection of right knee lesion with allograft and complicated total knee [de-identified] : Patient is doing pretty well postoperatively.  He has virtually no pain.  He is able to move well at this.  He still has some left knee pain but is nothing compared to the other side. [de-identified] : On exam incisions clean dry and intact.  Range of motion is 0 to 115 degrees  He stable to varus and valgus testing at flexion mid flexion and extension.  His calf is soft and he is neurovascular intact.Left knee range of motion is3 degrees to 110 degrees.  There is some minimal varus alignment but no instability.   [de-identified] : X-rays today multiple views of bilateral knees show the right knee arthroplasty in good position with the longstem in the tibia.  There are no periprosthetic problems and nothing is loosening. The left knee does show the same degenerative changes as before.  There is significant varus alignment with medial sided joint space narrowing collapse and osteophyte formation. [de-identified] : 3 months postop right TKA, doing well.  He is interested in doing his left knee, but not yet. [de-identified] :   If imaging was ordered, the patient was told to make an appointment to review findings right after all imaging is completed.  We discussed risks, benefits and alternatives. Rationale of care was reviewed and all questions were answered. Patient (and family) had all questions answered to her degree of the level of satisfaction. Patient (and family) expressed understanding and interest in proceeding with the plan as outlined.     This note was done with a voice recognition transcription software and any typos are related to this rather than medical error. Surgical risks reviewed. Patient (and family) had all questions answered to an agreeable level of satisfaction. Patient (and family) expressed understanding and interest in proceeding with the plan as outlined.

## 2024-03-19 ENCOUNTER — APPOINTMENT (OUTPATIENT)
Dept: ORTHOPEDIC SURGERY | Facility: CLINIC | Age: 79
End: 2024-03-19
Payer: MEDICARE

## 2024-03-19 DIAGNOSIS — Z96.651 PRESENCE OF RIGHT ARTIFICIAL KNEE JOINT: ICD-10-CM

## 2024-03-19 DIAGNOSIS — M17.12 UNILATERAL PRIMARY OSTEOARTHRITIS, LEFT KNEE: ICD-10-CM

## 2024-03-19 PROCEDURE — 73562 X-RAY EXAM OF KNEE 3: CPT | Mod: 50

## 2024-03-19 PROCEDURE — 99213 OFFICE O/P EST LOW 20 MIN: CPT

## 2024-03-19 NOTE — HISTORY OF PRESENT ILLNESS
[___ Months Post Op] : [unfilled] months post op [0] : no pain reported [Clean/Dry/Intact] : clean, dry and intact [Swelling] : not swollen [Neuro Intact] : an unremarkable neurological exam [Vascular Intact] : ~T peripheral vascular exam normal [Negative Nael's] : maneuvers demonstrated a negative Nael's sign [Doing Well] : is doing well [Excellent Pain Control] : has excellent pain control [No Sign of Infection] : is showing no signs of infection [de-identified] : Patient is doing pretty well postoperatively.  He has virtually no pain.  He is able to move well at this.  He still has some left knee pain and stiffness but he realizes that this is nothing like what he had on the right.  He currently has no right pain. [de-identified] : 8/30/2023 - Resection of right knee lesion with allograft and complicated total knee [de-identified] : X-rays today multiple views of bilateral knees show the right knee arthroplasty in good position with the longstem in the tibia.  There are no periprosthetic problems and nothing is loosening. The left knee does show the same severe medial and PF degenerative changes as before.  There is significant varus alignment with medial sided joint space narrowing collapse and osteophyte formation. [de-identified] : 6 months postop right TKA, doing well.  He is interested in doing his left knee, but not yet. [de-identified] : On exam incisions clean dry and intact.  Range of motion is 0 to 115 degrees  He stable to varus and valgus testing at flexion mid flexion and extension.  His calf is soft and he is neurovascular intact.Left knee range of motion is3 degrees to 100 degrees.  There is some minimal varus alignment but no instability.   [de-identified] :   If imaging or pathology/biopsy was ordered, the patient was told to make an appointment to review findings right after all imaging is completed.  We discussed risks, benefits and alternatives. Rationale of care was reviewed and all questions were answered. Patient (and family) had all questions answered to her degree of the level of satisfaction. Patient (and family) expressed understanding and interest in proceeding with the plan as outlined.     This note was done with a voice recognition transcription software and any typos are related to this rather than medical error. Surgical risks reviewed. Patient (and family) had all questions answered to an agreeable level of satisfaction. Patient (and family) expressed understanding and interest in proceeding with the plan as outlined.

## 2024-03-27 NOTE — H&P PST ADULT - LYMPHATIC
PCP: Aida Alberto MD     Last appt:  1/30/2024      Future Appointments   Date Time Provider Department Center   8/5/2024 11:10 AM Aida Alberto MD [facility] AMB          Requested Prescriptions     Pending Prescriptions Disp Refills    metoprolol tartrate (LOPRESSOR) 50 MG tablet [Pharmacy Med Name: METOPROLOL TARTRATE 50 MG Tablet] 180 tablet 3     Sig: TAKE 1 TABLET TWICE DAILY      
No lymphadedenopathy

## (undated) DEVICE — SAW BLADE STRYKER SAGITTAL DUAL CUT 64X35X.89MM

## (undated) DEVICE — SUT VICRYL 0 27" OS-6 UNDYED

## (undated) DEVICE — VENODYNE/SCD SLEEVE CALF MEDIUM

## (undated) DEVICE — LABELS BLANK W PEN

## (undated) DEVICE — MAKO BLADE STANDARD

## (undated) DEVICE — DRSG KLING 2"

## (undated) DEVICE — TAPE SILK 3"

## (undated) DEVICE — SAW BLADE STRYKER SAGITTAL 3 HOLE OSCILLATING

## (undated) DEVICE — PACK LIJ BASIC ORTHO

## (undated) DEVICE — TOURNIQUET ESMARK 6"

## (undated) DEVICE — DRSG STOCKINETTE IMPERVIOUS XL

## (undated) DEVICE — PACK TOTAL JOINT

## (undated) DEVICE — ELCTR AQUAMANTYS BIPOLAR SEALER 6.0

## (undated) DEVICE — DRSG COBAN 6"

## (undated) DEVICE — SUT VICRYL 2-0 27" PS-2 UNDYED

## (undated) DEVICE — SYR LUER LOK 20CC

## (undated) DEVICE — MAKO CHECKPOINT KIT FEMORAL / TIBIAL

## (undated) DEVICE — MAKO VIZADISC KNEE TRACKING KIT

## (undated) DEVICE — PREP CHLORAPREP HI-LITE ORANGE 26ML

## (undated) DEVICE — NDL PRECISION GLIDE 18G X 1.5

## (undated) DEVICE — MAKO DRAPE KIT

## (undated) DEVICE — SUT QUILL PDO 2 36CM 40MM

## (undated) DEVICE — SUT VICRYL 1 36" CTX UNDYED

## (undated) DEVICE — DRAPE EXTREMITY 87" X 106" X 128"

## (undated) DEVICE — SUCTION YANKAUER NO CONTROL VENT

## (undated) DEVICE — WOUND IRR IRRISEPT W 0.5 CHG

## (undated) DEVICE — WARMING BLANKET FULL ADULT

## (undated) DEVICE — NDL SPINAL 22G X 1.5" (BLACK)

## (undated) DEVICE — TOURNIQUET CUFF 34" DUAL PORT W PLC

## (undated) DEVICE — DRSG DERMABOND 0.7ML

## (undated) DEVICE — POSITIONER STRAP ARMBOARD VELCRO TS-30

## (undated) DEVICE — NDL HYPO SAFE 18G X 1.5" (PINK)

## (undated) DEVICE — SOL IRR POUR H2O 1500ML

## (undated) DEVICE — GLV 8 PROTEXIS (CREAM) MICRO

## (undated) DEVICE — SAW BLADE STRYKER RECIPROCATING DOUBLE EDGE 68X12.5MM

## (undated) DEVICE — DRSG COMBINE 5X9"

## (undated) DEVICE — DRAPE 3/4 SHEET 52X76"

## (undated) DEVICE — ELCTR BOVIE PENCIL SMOKE EVACUATION

## (undated) DEVICE — NDL HYPO SAFE 21G X 1.5" (GREEN)

## (undated) DEVICE — SUT MONOCRYL 3-0 18" PS-2 UNDYED

## (undated) DEVICE — DRSG ACE BANDAGE 6"

## (undated) DEVICE — DRSG DERMABOND MINI

## (undated) DEVICE — ELCTR GROUNDING PAD ADULT COVIDIEN

## (undated) DEVICE — DRAPE IOBAN 33" X 23"

## (undated) DEVICE — CANISTER DISPOSABLE THIN WALL 3000CC

## (undated) DEVICE — HANDPIECE INTERPULSE W/ COAXIAL FAN SPRAY TIP

## (undated) DEVICE — HOOD T5 PEELAWAY

## (undated) DEVICE — DRSG AQUACEL 3.5 X 10"

## (undated) DEVICE — PROTECTOR HEEL / ELBOW

## (undated) DEVICE — SYR LUER LOK 50CC

## (undated) DEVICE — SOL IRR BAG NS 0.9% 3000ML

## (undated) DEVICE — SOL IRR POUR NS 0.9% 1500ML

## (undated) DEVICE — STRYKER BONE MILL BLADE FINE 3.2MM